# Patient Record
Sex: MALE | Race: WHITE | NOT HISPANIC OR LATINO | Employment: FULL TIME | URBAN - METROPOLITAN AREA
[De-identification: names, ages, dates, MRNs, and addresses within clinical notes are randomized per-mention and may not be internally consistent; named-entity substitution may affect disease eponyms.]

---

## 2018-11-13 ENCOUNTER — HOSPITAL ENCOUNTER (EMERGENCY)
Facility: CLINIC | Age: 28
Discharge: HOME | End: 2018-11-13
Attending: PHYSICIAN ASSISTANT | Admitting: PHYSICIAN ASSISTANT

## 2018-11-13 ENCOUNTER — APPOINTMENT (OUTPATIENT)
Dept: GENERAL RADIOLOGY | Facility: CLINIC | Age: 28
End: 2018-11-13
Attending: PHYSICIAN ASSISTANT

## 2018-11-13 VITALS
HEART RATE: 80 BPM | SYSTOLIC BLOOD PRESSURE: 154 MMHG | RESPIRATION RATE: 18 BRPM | OXYGEN SATURATION: 98 % | DIASTOLIC BLOOD PRESSURE: 88 MMHG | TEMPERATURE: 98.2 F

## 2018-11-13 DIAGNOSIS — S90.31XA CONTUSION OF RIGHT FOOT, INITIAL ENCOUNTER: ICD-10-CM

## 2018-11-13 PROCEDURE — 73630 X-RAY EXAM OF FOOT: CPT | Mod: RT

## 2018-11-13 PROCEDURE — 73650 X-RAY EXAM OF HEEL: CPT | Mod: RT

## 2018-11-13 PROCEDURE — 99284 EMERGENCY DEPT VISIT MOD MDM: CPT

## 2018-11-13 ASSESSMENT — ENCOUNTER SYMPTOMS: ARTHRALGIAS: 1

## 2018-11-13 NOTE — ED TRIAGE NOTES
Patient states he had a fall on Halloween and landed with his full weight onto his right heel. States he did not seek medical care at that time as he does not have health insurance and also because he thought the discomfort would improve on its own. Patient states he continues with symptoms and is even missing work due to the discomfort.

## 2018-11-13 NOTE — ED AVS SNAPSHOT
[facility]    [address]    [facility] [address]    Phone:  [phone]    Fax:  [phone]                                       Mark Teran   MRN: [id number]    Department:  [facility]   Date of Visit:  11/13/2018           After Visit Summary Signature Page     I have received my discharge instructions, and my questions have been answered. I have discussed any challenges I see with this plan with the nurse or doctor.    ..........................................................................................................................................  Patient/Patient Representative Signature      ..........................................................................................................................................  Patient Representative Print Name and Relationship to Patient    ..................................................               ................................................  Date                                   Time    ..........................................................................................................................................  Reviewed by Signature/Title    ...................................................              ..............................................  Date                                               Time          22EPIC Rev 08/18

## 2018-11-13 NOTE — LETTER
November 13, 2018      To Whom It May Concern:      Mark Teran was seen in our Emergency Department today, 11/13/18.  I expect his condition to improve over the next 1-2 days.  He may return to work when improved.    Sincerely,        Matilde Ayala PA-C

## 2018-11-13 NOTE — ED PROVIDER NOTES
History     Chief Complaint:  Foot Pain    HPI   Mark Teran is a 28 year old male who presents to the emergency department for evaluation of foot pain. The patient reports he was at a Halloween party on 11/31 when he sustained an injury to his right heel. He states he does not have full recall of the events of that night but suspects he may have jumped off his stairs onto concrete. The patient indicates he has, since 11/31, experienced right heel pain, worsened with standing or ambulation, although he is able to do so with pain. He has taken ibuprofen and Excedrin with mild improvement in pain management. He also remarks the pain worsens at night.    Allergies:  NKDA     Medications:    Lamictal     Past Medical History:    Depressive disorder    Past Surgical History:    The patient does not have any pertinent past surgical history.    Family History:    No past pertinent family history.    Social History:  Presents with female .  Current some day smoker, 0.5 ppd.  Positive for alcohol use.   Marital Status:  Single [1]     Review of Systems   Musculoskeletal: Positive for arthralgias and gait problem.   All other systems reviewed and are negative.    Physical Exam     Patient Vitals for the past 24 hrs:   BP Temp Pulse Resp SpO2   11/13/18 1733 154/88 98.2  F (36.8  C) 80 18 98 %       Physical Exam  Constitutional: Alert, attentive, GCS 15   CV: 2+ DP and PT pulses, brisk distal cap refill  Pulm: No respiratory distress  MSK: Right foot: Tenderness overlying the calcaneus bone, remainder of foot non-tender.  No tenderness to the midfoot, base of 5th metatarsal or proximal tib/fib.No ecchymosis, swelling, or erythema.  Neurological: Alert, attentive  5/5 strength to the DF and PF motor functions; sensation intact right foot.  Skin: Skin is warm and dry.  Psych: Normal mood and affect     Emergency Department Course     Imaging:  Radiographic findings were communicated with the patient who voiced  understanding of the findings.    XR Foot, G/E 3 Views, Right :  No acute osseous abnormality demonstrated. As per radiology.    XR Calcaneus Right G/E 2 Views :  No acute osseous abnormality demonstrated. As per radiology.    Emergency Department Course:  Nursing notes and vitals reviewed. 1750 I performed an exam of the patient as documented above.     The patient was sent for a XR Foot, XR Calcaneus while in the emergency department, findings above.     1847 I rechecked the patient and discussed the results of his workup thus far.     Findings and plan explained to the Patient. Patient discharged home with instructions regarding supportive care, medications, and reasons to return. The importance of close follow-up was reviewed.     Impression & Plan      Medical Decision Making:  Mark Teran is a 28 year old male presents with right heel pain.  X-ray negative for fracture.  Signs and symptoms are consistent with a contusion.  A broad differential was considered including sprain, fracture, tendon rupture, nerve impingement/compromise, referred pain. Supportive outpatient management is indicated.  Rest, ice, and elevation treatment discussed with the patient. Close plans follow-up with orthopedics should pain continue.     Diagnosis:    ICD-10-CM   1. Contusion of right foot, initial encounter S90.31XA       Disposition:  discharged to home    INino, am serving as a scribe on 11/13/2018 at 5:32 PM to personally document services performed by Matilde Ayala PA-C based on my observations and the provider's statements to me.     Nino Vargas  11/13/2018   [facility] EMERGENCY DEPARTMENT       Matilde Ayala PA-C  11/13/18 2329

## 2018-11-13 NOTE — ED AVS SNAPSHOT
[facility] Emergency Department    [address]    [place] [address]    Phone:  [phone]    Fax:  [phone]                                       Mark Teran   MRN: [id number]    Department:  [facility] Emergency Department   Date of Visit:  11/13/2018           Patient Information     Date Of Birth          1990        Your diagnoses for this visit were:     Contusion of right foot, initial encounter        You were seen by Matilde Ayala PA-C.      Follow-up Information     Follow up with [facility] In 2 days.    Why:  As needed    Contact information:    [address], [facility] [address]  [facility] [address]  [phone]          Follow up with [facility] Emergency Department.    Specialty:  EMERGENCY MEDICINE    Why:  As needed, If symptoms worsen    Contact information:    [address] [facility] [address] [phone]        Discharge Instructions       Take ibuprofen 600 mg 3x per day.  This will provide both pain control and fight against inflammation.   Use ACE wrap and crutches as needed for relief of pain.   Follow up with orthopedics in 2-3 days if symptoms persist.         Foot Contusion  You have a contusion. This is also called a bruise. There is swelling and some bleeding under the skin, but no broken bones. This injury generally takes a few days to a few weeks to heal.  During that time, the bruise will typically change in color from reddish, to purple-blue, to greenish-yellow, then to yellow-brown.  Home care    Elevate the foot to reduce pain and swelling. As much as possible, sit or lie down with the foot raised about the level of your heart. This is especially important during the first 48 hours.    Ice the foot to help reduce pain and swelling. Wrap a cold source (ice pack or ice cubes in a plastic bag) in a thin towel. Apply to the bruised area for 20 minutes every 1 to 2  hours the first day. Continue this 3 to 4 times a day until the pain and swelling goes away.    Unless another medicine was prescribed, you can take acetaminophen, ibuprofen, or naproxen to control pain. (If you have chronic liver or kidney disease or ever had a stomach ulcer or gastrointestinal bleeding, talk with your healthcare provider before using these medicines.)  Follow up  Follow up with your healthcare provider or our staff as advised. Call if you are not improving within 1 to 2 weeks.  When to seek medical advice   Call your healthcare provider right away if you have any of the following:    Increased pain or swelling    Foot or leg becomes cold, blue, numb or tingly    Signs of infection: Warmth, drainage, or increased redness or pain around the bruise    Inability to move the injured foot     Frequent bruising for unknown reasons  Date Last Reviewed: 2/1/2017 2000-2018 [employer]. [address] [place] [address]. All rights reserved. This information is not intended as a substitute for professional medical care. Always follow your healthcare professional's instructions.          24 Hour Appointment Hotline       To make an appointment at any [facility], call [phone] ([phone]). If you don't have a family doctor or clinic, we will help you find one. [facility] clinics are conveniently located to serve the needs of you and your family.             Review of your medicines      Our records show that you are taking the medicines listed below. If these are incorrect, please call your family doctor or clinic.        Dose / Directions Last dose taken    LAMICTAL PO        Refills:  0                Procedures and tests performed during your visit     Foot  XR, G/E 3 views, right    XR Calcaneus Right G/E 2 Views      Orders Needing Specimen Collection     None      Pending Results     No orders found from 11/11/2018 to 11/14/2018.            Pending Culture Results      No orders found from 11/11/2018 to 11/14/2018.            Pending Results Instructions     If you had any lab results that were not finalized at the time of your Discharge, you can call the ED Lab Result RN at [phone]. You will be contacted by this team for any positive Lab results or changes in treatment. The nurses are available 7 days a week from 10A to 6:30P.  You can leave a message 24 hours per day and they will return your call.        Test Results From Your Hospital Stay        11/13/2018  6:31 PM      Narrative     RIGHT FOOT THREE VIEWS  11/13/2018 6:09 PM     HISTORY: Pain over calcaneus bone.    COMPARISON: None.    FINDINGS: There is no significant degenerative change. The Lisfranc  joint appears unremarkable in these views. The plantar arch is  unremarkable in these views. There is no acute fracture or  dislocation. There are no worrisome bony lesions.        Impression     IMPRESSION: No acute osseous abnormality demonstrated.    GIOVANNI RIVERA MD         11/13/2018  6:30 PM      Narrative     CALCANEUS/OS CALSIS/HEEL TWO VIEWS RIGHT 11/13/2018 6:09 PM     HISTORY: Pain over calcaneus bone.    COMPARISON: None.    FINDINGS: There is no significant degenerative change. There is no  acute fracture. No dislocation. There are no worrisome bony lesions.        Impression     IMPRESSION:  No acute osseous abnormality demonstrated.    GIOVANNI RIVERA MD                Clinical Quality Measure: Blood Pressure Screening     Your blood pressure was checked while you were in the emergency department today. The last reading we obtained was  BP: 154/88 . Please read the guidelines below about what these numbers mean and what you should do about them.  If your systolic blood pressure (the top number) is less than 120 and your diastolic blood pressure (the bottom number) is less than 80, then your blood pressure is normal. There is nothing more that you need to do about it.  If your systolic blood pressure (the  "top number) is 120-139 or your diastolic blood pressure (the bottom number) is 80-89, your blood pressure may be higher than it should be. You should have your blood pressure rechecked within a year by a primary care provider.  If your systolic blood pressure (the top number) is 140 or greater or your diastolic blood pressure (the bottom number) is 90 or greater, you may have high blood pressure. High blood pressure is treatable, but if left untreated over time it can put you at risk for heart attack, stroke, or kidney failure. You should have your blood pressure rechecked by a primary care provider within the next 4 weeks.  If your provider in the emergency department today gave you specific instructions to follow-up with your doctor or provider even sooner than that, you should follow that instruction and not wait for up to 4 weeks for your follow-up visit.        Thank you for choosing [facility]       Thank you for choosing [facility] for your care. Our goal is always to provide you with excellent care. Hearing back from our patients is one way we can continue to improve our services. Please take a few minutes to complete the written survey that you may receive in the mail after you visit with us. Thank you!        [employer] Information     [employer] lets you send messages to your doctor, view your test results, renew your prescriptions, schedule appointments and more. To sign up, go to [email or web address] . Click on \"Log in\" on the left side of the screen, which will take you to the Welcome page. Then click on \"Sign up Now\" on the right side of the page.     You will be asked to enter the access code listed below, as well as some personal information. Please follow the directions to create your username and password.     Your access code is: [email or web address]  Expires: 2019  6:54 PM     Your access code will  in 90 days. If you need help or a new code, please call your [facility] clinic or [phone].      "   Care EveryWhere ID     This is your Care EveryWhere ID. This could be used by other organizations to access your [facility] medical records  [id number]        Equal Access to Services     DIANA ULLOA : Marcie Lozada, fernando lin, clyde denton, rick leggett. So [facility] [phone].    ATENCIÓN: Si habla español, tiene a griffiths disposición servicios gratuitos de asistencia lingüística. Llame al [phone].    We comply with applicable federal civil rights laws and [place] laws. We do not discriminate on the basis of race, color, national origin, age, disability, sex, sexual orientation, or gender identity.            After Visit Summary       This is your record. Keep this with you and show to your community pharmacist(s) and doctor(s) at your next visit.

## 2018-11-14 NOTE — DISCHARGE INSTRUCTIONS
Take ibuprofen 600 mg 3x per day.  This will provide both pain control and fight against inflammation.   Use ACE wrap and crutches as needed for relief of pain.   Follow up with orthopedics in 2-3 days if symptoms persist.         Foot Contusion  You have a contusion. This is also called a bruise. There is swelling and some bleeding under the skin, but no broken bones. This injury generally takes a few days to a few weeks to heal.  During that time, the bruise will typically change in color from reddish, to purple-blue, to greenish-yellow, then to yellow-brown.  Home care    Elevate the foot to reduce pain and swelling. As much as possible, sit or lie down with the foot raised about the level of your heart. This is especially important during the first 48 hours.    Ice the foot to help reduce pain and swelling. Wrap a cold source (ice pack or ice cubes in a plastic bag) in a thin towel. Apply to the bruised area for 20 minutes every 1 to 2 hours the first day. Continue this 3 to 4 times a day until the pain and swelling goes away.    Unless another medicine was prescribed, you can take acetaminophen, ibuprofen, or naproxen to control pain. (If you have chronic liver or kidney disease or ever had a stomach ulcer or gastrointestinal bleeding, talk with your healthcare provider before using these medicines.)  Follow up  Follow up with your healthcare provider or our staff as advised. Call if you are not improving within 1 to 2 weeks.  When to seek medical advice   Call your healthcare provider right away if you have any of the following:    Increased pain or swelling    Foot or leg becomes cold, blue, numb or tingly    Signs of infection: Warmth, drainage, or increased redness or pain around the bruise    Inability to move the injured foot     Frequent bruising for unknown reasons  Date Last Reviewed: 2/1/2017 2000-2018 [employer]. [address] [facility] [place] [address]. All rights reserved. This  information is not intended as a substitute for professional medical care. Always follow your healthcare professional's instructions.

## 2021-09-20 ENCOUNTER — OFFICE VISIT (OUTPATIENT)
Dept: INTERNAL MEDICINE | Facility: CLINIC | Age: 31
End: 2021-09-20
Payer: COMMERCIAL

## 2021-09-20 VITALS
WEIGHT: 181.5 LBS | DIASTOLIC BLOOD PRESSURE: 86 MMHG | SYSTOLIC BLOOD PRESSURE: 130 MMHG | BODY MASS INDEX: 24.62 KG/M2 | HEART RATE: 83 BPM | TEMPERATURE: 98.2 F | RESPIRATION RATE: 16 BRPM | OXYGEN SATURATION: 98 %

## 2021-09-20 DIAGNOSIS — F31.0 BIPOLAR AFFECTIVE DISORDER, CURRENT EPISODE HYPOMANIC (H): Primary | ICD-10-CM

## 2021-09-20 PROCEDURE — 99204 OFFICE O/P NEW MOD 45 MIN: CPT | Performed by: INTERNAL MEDICINE

## 2021-09-20 RX ORDER — QUETIAPINE FUMARATE 100 MG/1
TABLET, FILM COATED ORAL
Qty: 30 TABLET | Refills: 0 | Status: SHIPPED | OUTPATIENT
Start: 2021-09-20 | End: 2021-10-15

## 2021-09-20 RX ORDER — DESONIDE 0.5 MG/G
CREAM TOPICAL
COMMUNITY
Start: 2021-08-27

## 2021-09-20 ASSESSMENT — ANXIETY QUESTIONNAIRES
2. NOT BEING ABLE TO STOP OR CONTROL WORRYING: NEARLY EVERY DAY
IF YOU CHECKED OFF ANY PROBLEMS ON THIS QUESTIONNAIRE, HOW DIFFICULT HAVE THESE PROBLEMS MADE IT FOR YOU TO DO YOUR WORK, TAKE CARE OF THINGS AT HOME, OR GET ALONG WITH OTHER PEOPLE: EXTREMELY DIFFICULT
1. FEELING NERVOUS, ANXIOUS, OR ON EDGE: NEARLY EVERY DAY
6. BECOMING EASILY ANNOYED OR IRRITABLE: NEARLY EVERY DAY
3. WORRYING TOO MUCH ABOUT DIFFERENT THINGS: NEARLY EVERY DAY
5. BEING SO RESTLESS THAT IT IS HARD TO SIT STILL: NEARLY EVERY DAY
GAD7 TOTAL SCORE: 21
7. FEELING AFRAID AS IF SOMETHING AWFUL MIGHT HAPPEN: NEARLY EVERY DAY

## 2021-09-20 ASSESSMENT — PATIENT HEALTH QUESTIONNAIRE - PHQ9
5. POOR APPETITE OR OVEREATING: NEARLY EVERY DAY
SUM OF ALL RESPONSES TO PHQ QUESTIONS 1-9: 13

## 2021-09-21 ASSESSMENT — ANXIETY QUESTIONNAIRES: GAD7 TOTAL SCORE: 21

## 2021-10-13 DIAGNOSIS — F31.0 BIPOLAR AFFECTIVE DISORDER, CURRENT EPISODE HYPOMANIC (H): ICD-10-CM

## 2021-10-14 NOTE — TELEPHONE ENCOUNTER
Routing refill request to provider for review/approval because:  Last signed by Dr. Lan     Antipsychotic Medications Hgtlir19/13/2021 02:28 PM   Lipid panel on file within the past 12 months Protocol Details    CBC on file in past 12 months     A1c or Glucose on file in past 12 months     Recent (6 mo) or future (30 days) visit within the authorizing provider's specialty      Alma Pearson RN

## 2021-10-15 RX ORDER — QUETIAPINE FUMARATE 100 MG/1
100 TABLET, FILM COATED ORAL EVERY EVENING
Qty: 60 TABLET | Refills: 0 | Status: SHIPPED | OUTPATIENT
Start: 2021-10-15 | End: 2021-12-14

## 2021-10-15 NOTE — TELEPHONE ENCOUNTER
Mark really does need to establish with psychiatry as I am concerned he has undiagnosed bipolar disorder which is being inadequately treated with Seroquel. I can refill this medication for another short period of time but please call Mark and give him the information for him to establish with psychiatry (referral was placed last month at our only visit) so he can get the best care here.

## 2021-10-19 RX ORDER — QUETIAPINE FUMARATE 100 MG/1
100 TABLET, FILM COATED ORAL EVERY EVENING
Qty: 60 TABLET | Refills: 0 | Status: CANCELLED | OUTPATIENT
Start: 2021-10-19

## 2021-10-19 NOTE — TELEPHONE ENCOUNTER
Dr Lan,     Patient has an appointment Thursday Oct 21st virtual at [facility], but he will run out that day. Are you willing to send in short refill supply- did you already send it in ( it looks like that way).       Patient sound great on the phone.     Paula Simpson RN

## 2021-12-10 DIAGNOSIS — F31.0 BIPOLAR AFFECTIVE DISORDER, CURRENT EPISODE HYPOMANIC (H): ICD-10-CM

## 2021-12-10 DIAGNOSIS — Z79.899 LONG TERM CURRENT USE OF ANTIPSYCHOTIC MEDICATION: Primary | ICD-10-CM

## 2021-12-10 NOTE — CONFIDENTIAL NOTE
Routing refill request to provider for review/approval because:  Labs out of range:  Lipid. CBC, A1C/glucose

## 2021-12-11 NOTE — TELEPHONE ENCOUNTER
Can someone reach out to Mark and schedule a follow-up visit (virtual video okay)? This script was supposed to be a bridge to a psychiatrist taking over his mental health medications. At his last refill request, he told RN that he had an appointment scheduled in October to establish with psychiatry. I see no record of that (though it may have been on outside psychiatrist). Per my count, he should have run out of Seroquel about a month ago. My assumption has been psychiatry has adjusted his regimen, but he subsequently submitted this refill request. We need to clarify what's going on with his meds here. If he is planning on remaining on Seroquel, he is overdue for some labs.

## 2021-12-13 NOTE — TELEPHONE ENCOUNTER
Patient returned call, he did reach out to  for mental health services but could not be seen until February.  He decided to try [facility] but stated the cost was to much, it would 'cost me a $1000 a month.'  Patient is unsure if the medication is even doing anything for him.  Stated 'I cant put my family in debt for my mental health, you have to [place] get mental health.'    Advised of labs needed and he is uncertain if he is going to schedule at this time.  He was working and it was hard to hear with a lot of background noise.  Patient also stated it is hard to get an appointment because we are only open M-F.    Asia Berry RN

## 2021-12-13 NOTE — TELEPHONE ENCOUNTER
If the medication is not working, can we schedule a virtual or in-person follow up visit? I also encourage him to make an appointment with [facility] mental [facility] services to get something on the books even if it's months away.

## 2021-12-14 RX ORDER — QUETIAPINE FUMARATE 100 MG/1
TABLET, FILM COATED ORAL
Qty: 55 TABLET | Refills: 0 | Status: SHIPPED | OUTPATIENT
Start: 2021-12-14 | End: 2022-01-25

## 2021-12-14 NOTE — TELEPHONE ENCOUNTER
Pt returned call and he was notified of the message from Dr. Lan. Advised pt to contact his insurance in regards to cheapest places to go for psychiatry. Pt reports he will do this.     Gladis Hyde RN

## 2021-12-14 NOTE — TELEPHONE ENCOUNTER
I'm empathetic to his desire to avoid bills, time off work, etc. but bipolar disorder really is a condition that requires close follow-up, especially during the stabilization period. I strongly recommend Mark schedule with psychiatry. Happy to see him in the interim if he desires. I have ordered the requisite labs to monitor long-term Seroquel use. Okay for him to titrate up from 100mg daily to 200mg daily to see if he gets any additional benefit from increased dose. Any further questions or concerns should be addressed via formal appointment.    Lior Paredes MD, MPH  [facility]  Internal Medicine

## 2021-12-14 NOTE — TELEPHONE ENCOUNTER
Routing refill request to provider for review/approval because:  Please see phone message below     Shahbaz Dejesus RN  [facility] Triage Nurse    Color consistent with ethnicity/race, warm, dry intact, resilient.

## 2021-12-14 NOTE — TELEPHONE ENCOUNTER
Called patient. He states he is feeling manic low and like the Seroquel did nothing for his anxiety but he might be sleeping slightly better. Offered virtual visit and to schedule with mental health. Patient states he cant he needs to call billing as he cant afford all of these bills. He also states he can only see Dr. dowd after 9pm M-Sat or on Sunday as he works. He states he does not get a lunch break and can not ask for  a 30 min break to fit in a virtual visit. He is hoping Dr. Dowd can prescribe something or adjust dose. Explained this is difficult to do without a visit but nurse would send message    Please advise    Shahbaz Dejesus RN

## 2022-01-24 ENCOUNTER — TELEPHONE (OUTPATIENT)
Dept: INTERNAL MEDICINE | Facility: CLINIC | Age: 32
End: 2022-01-24
Payer: COMMERCIAL

## 2022-01-24 DIAGNOSIS — F31.0 BIPOLAR AFFECTIVE DISORDER, CURRENT EPISODE HYPOMANIC (H): ICD-10-CM

## 2022-01-24 NOTE — TELEPHONE ENCOUNTER
"Pt called reporting frustration that he is unable to get an appt mental health provider. States he made an appt with a mental health clinic and insurance only paid for $5 and now owes $500. States he doesn't have money to pay $2,500 for an appt. Reports he was called to cancel mental health appt tonight and plans to stop medication \"I'm givin up\". Triage asked if any suicidal thoughts or plans. Pt denies. States he can get a counselor through \"[facility]\" but he doesn't need a counselor. States he needs medication \"I know what I need\".     Triage discouraged him from discontinuing medication without medical advice. Advised he seek care at [facility] in path unit if any suicidal thoughts or plans. Asked if he would like a referral for social work or care coordinator to provide mental health resources but pt laughed and      Routing message to PCP.  Pt needs Rx renewal for Seroquel please advice if Rx renewal is appropriate. Any other recommendations from provider?   "

## 2022-01-25 RX ORDER — QUETIAPINE FUMARATE 200 MG/1
200 TABLET, FILM COATED ORAL EVERY EVENING
Qty: 90 TABLET | Refills: 0 | Status: SHIPPED | OUTPATIENT
Start: 2022-01-25 | End: 2022-02-28

## 2022-01-25 NOTE — TELEPHONE ENCOUNTER
Patient was called with provider's message. He agreed to continue taking Seroquel. He is asking if he needs any blood work to check how Seroquel is affecting him. Any other lab work to be done in addition to lipid profile and Hgb A1C? Will need to call back to schedule a lab only appt.     Pt also verbalized his incompetence to schedule the correct appt with psychiatry and know which ones would be covered. He did contact his insurance, but is unable to afford the options for psychiatry they offered. He is open to a call from clinic's  in regards to this. Care coordination referral placed.

## 2022-01-25 NOTE — TELEPHONE ENCOUNTER
I'm sorry to hear Mark is doing so poorly and is unable to get the care he clearly needs. As I discussed with him the one time we met, I'm not comfortable managing his bipolar disorder but am happy to help as best as I can. I do think he would GREATLY benefit from formal psychiatric care from a mental health expert. I am empathetic to his financial situation. I have refilled the Seroquel at his current dose for another 3 months so that he's not without any medication, but this is an imperfect solution to a serious issue and I do still strongly recommend he seek formal psychiatric care.    Lior Paredes MD, MPH  [facility]  Internal Medicine

## 2022-01-26 ENCOUNTER — PATIENT OUTREACH (OUTPATIENT)
Dept: NURSING | Facility: CLINIC | Age: 32
End: 2022-01-26
Payer: COMMERCIAL

## 2022-01-26 NOTE — PROGRESS NOTES
Clinic Care Coordination Contact  [facility]    Referral Source: PCP  Clinical Data: Care Coordinator Outreach  Outreach attempted x 1.  Spoke to patient briefly who would like a call back in the morning as he was driving at the time of outreach.  Plan:  Care Coordinator will try to reach patient again in 1-2 business days.      SARINA Lucero  Clinic Care Coordinator  [facility]  [phone]  [email or web address], [email or web address]

## 2022-02-05 ENCOUNTER — LAB (OUTPATIENT)
Dept: LAB | Facility: CLINIC | Age: 32
End: 2022-02-05
Payer: COMMERCIAL

## 2022-02-05 DIAGNOSIS — Z79.899 LONG TERM CURRENT USE OF ANTIPSYCHOTIC MEDICATION: ICD-10-CM

## 2022-02-05 LAB
CHOLEST SERPL-MCNC: 209 MG/DL
FASTING STATUS PATIENT QL REPORTED: YES
HBA1C MFR BLD: 5.2 % (ref 0–5.6)
HDLC SERPL-MCNC: 45 MG/DL
LDLC SERPL CALC-MCNC: 150 MG/DL
NONHDLC SERPL-MCNC: 164 MG/DL
TRIGL SERPL-MCNC: 69 MG/DL

## 2022-02-05 PROCEDURE — 80061 LIPID PANEL: CPT

## 2022-02-05 PROCEDURE — 83036 HEMOGLOBIN GLYCOSYLATED A1C: CPT

## 2022-02-05 PROCEDURE — 36415 COLL VENOUS BLD VENIPUNCTURE: CPT

## 2022-02-09 ENCOUNTER — OFFICE VISIT (OUTPATIENT)
Dept: URGENT CARE | Facility: URGENT CARE | Age: 32
End: 2022-02-09
Payer: COMMERCIAL

## 2022-02-09 VITALS
TEMPERATURE: 98.4 F | WEIGHT: 180 LBS | SYSTOLIC BLOOD PRESSURE: 142 MMHG | BODY MASS INDEX: 24.41 KG/M2 | RESPIRATION RATE: 20 BRPM | OXYGEN SATURATION: 96 % | HEART RATE: 95 BPM | DIASTOLIC BLOOD PRESSURE: 93 MMHG

## 2022-02-09 DIAGNOSIS — M62.830 BACK MUSCLE SPASM: Primary | ICD-10-CM

## 2022-02-09 PROCEDURE — 99213 OFFICE O/P EST LOW 20 MIN: CPT | Performed by: PHYSICIAN ASSISTANT

## 2022-02-09 RX ORDER — CYCLOBENZAPRINE HCL 10 MG
10 TABLET ORAL 3 TIMES DAILY PRN
Qty: 21 TABLET | Refills: 0 | Status: SHIPPED | OUTPATIENT
Start: 2022-02-09 | End: 2022-02-16

## 2022-02-09 RX ORDER — PREDNISONE 20 MG/1
40 TABLET ORAL DAILY
Qty: 10 TABLET | Refills: 0 | Status: SHIPPED | OUTPATIENT
Start: 2022-02-09 | End: 2022-02-14

## 2022-02-09 NOTE — LETTER
[facility] [place]  [address] [address]  Phone: [phone]    February 9, 2022        Mark Teran  [address] [facility] [address]          To whom it may concern:    RE: Mark Teran    Patient was seen and treated today at our clinic and missed work.  Please excuse absences on 2/9 and 2/10/22.  Additionally, please limit lifting and pulling to <10# until symptoms resolve or follow up with sports medicine in 1 week.    Please contact me for questions or concerns.      Sincerely,        Kwadwo Carrero PA-C

## 2022-02-09 NOTE — PATIENT INSTRUCTIONS
Patient Education     Relieving Back Pain  Back pain is a common problem. You can strain back muscles by lifting too much weight or just by moving the wrong way. Back strain can be uncomfortable, even painful. And it can take weeks or months to improve. To help yourself feel better and prevent future back strains, try these tips.  Important: Don't give aspirin to children or teens without first discussing it with your child's healthcare provider.  Ice    Ice reduces muscle pain and swelling. It helps most during the first 24 to 48 hours after an injury.    Wrap an ice pack or a bag of frozen peas in a thin towel. Never put ice directly on your skin.    Place the ice where your back hurts the most.    Don t ice for more than 20 minutes at a time.    You can use ice several times a day.  Medicines  Over-the-counter pain relievers include acetaminophen and anti-inflammatory medicines, which includes aspirin, naproxen, or ibuprofen. They can help ease discomfort. Some also reduce swelling.    Tell your healthcare provider about any medicines you are already taking.    Take medicines only as directed.  Manipulation and massage  Having manipulation by an osteopathic doctor or chiropractor may be helpful. Getting a massage also may help.   Heat  After the first 48 hours, heat can relax sore muscles and improve blood flow.    Try a warm bath or shower. Or use a heating pad set on low. To prevent a burn, keep a cloth between you and the heating pad.    Don t use a heating pad for more than 15 minutes at a time. Never sleep on a heating pad.  [employer] last reviewed this educational content on 6/1/2018 2000-2021 [employer]. All rights reserved. This information is not intended as a substitute for professional medical care. Always follow your healthcare professional's instructions.           Patient Education     Back Spasm (No Trauma)    Spasm of the back muscles can occur after a sudden forceful twisting  or bending such as in a car accident. A spasm can also happen after a simple awkward movement, or after lifting something heavy with poor body positioning. In any case, muscle spasm adds to the pain. Sleeping in an awkward position or on a poor quality mattress can also cause this. Some people respond to emotional stress by tensing the muscles of their back.  Pain that continues may need further assessment or other types of treatment such as physical therapy.  You don't always need X-rays for the first assessment of back pain, unless you had a physical injury such as from a car accident or fall. If your pain continues and doesn't respond to medical treatment, X-rays and other tests may then be done.   Home care    As soon as possible, start sitting or walking again. This will help prevent problems from a long bed rest. These problems include muscle weakness, worsening back stiffness and pain, and blood clots in the legs.    When in bed, try to find a position of comfort. A firm mattress is best. Try lying flat on your back with pillows under your knees. You can also try lying on your side with your knees bent up toward your chest and a pillow between your knees.    Don't sit for long periods. Also limit car rides and travel. This puts more stress on the lower back than standing or walking.     During the first 24 to 72 hours after an injury or flare-up, put an ice pack on the painful area for 20 minutes, then remove it for 20 minutes. Do this over a period of 60 to 90 minutes, or several times a day. This will reduce swelling and pain. Always wrap ice packs in a thin towel.    You can start with ice, then switch to heat. Heat from a hot shower, hot bath, or heating pad reduces pain and works well for muscle spasms. Put heat on the painful area for 20 minutes, then remove it for 20 minutes. Do this over a period of 60 to 90 minutes, or several times a day. Don't sleep on a heating pad. It can burn or damage  skin.    Alternate using ice and heat.    Be aware of safe lifting methods. don't lift anything over 15 pounds until all the pain is gone.  Gentle stretching will help your back heal faster. Do this simple routine 2 to 3 times a day until your back is feeling better.    Lie on your back with your knees bent and both feet on the ground.    Slowly raise your left knee to your chest as you flatten your lower back against the floor. Hold for 20 to 30 seconds.    Relax and repeat the exercise with your right knee.    Do 2 to 3 of these exercises for each leg.    Repeat, hugging both knees to your chest at the same time.    Don't bounce, but use a gentle pull.  Medicines  Talk with your doctor before using medicine, especially if you have other medical problems or are taking other medicines.  You may use over-the-counter medicines such as acetaminophen, ibuprofen, or naprosyn to control pain, unless your healthcare provider prescribed another pain medicine. Talk with your healthcare provider if you have a chronic condition such as diabetes, liver or kidney disease, stomach ulcer, or digestive bleeding, or are taking blood thinners.  Be careful if you are given prescription pain medicine, opioids, or medicine for muscle spasm. They can cause drowsiness, and affect your coordination, reflexes, and judgment. Don't drive or operate heavy machinery when taking these medicines. Take pain medicine only as prescribed by your healthcare provider.  Follow-up care  Follow up with your doctor, or as advised. You may need physical therapy or more tests.  If X-rays were taken, they may be reviewed by a radiologist. You will be told of any new findings that may affect your care.  Call   Call if any of these occur:    Trouble breathing    Confusion    Drowsiness or trouble awakening    Fainting or loss of consciousness    Rapid or very slow heart rate    Loss of bowel or bladder control  When to seek medical advice  Call your healthcare  provider right away if any of these occur:    Pain becomes worse or spreads to your legs    Weakness or numbness in one or both legs    Numbness in the groin or genital area    Fever of 100.4 F (38 C) or higher , or as directed by your healthcare provider    Chills    Burning or pain when passing urine  [employer] last reviewed this educational content on 11/1/2018 2000-2021 [employer]. All rights reserved. This information is not intended as a substitute for professional medical care. Always follow your healthcare professional's instructions.

## 2022-02-09 NOTE — PROGRESS NOTES
SUBJECTIVE:  Chief Complaint   Patient presents with     Back Pain     mid-low areas for about 3 days     Mark Teran is a 31 year old male presents with a chief complaint of bilateral lowback pain.  The injury occurred 3 day(s) ago.   The injury happened while at home. How: sitting on bed for 8 hours straight playing video games.  The patient complained of moderate pain  and has not had decreased ROM.  Pain exacerbated by weight-bearing, movement, twisting and flexion/extension.  Relieved by nothing.  He treated it initially with ice. This is the first time this type of injury has occurred to this patient.     No numbness, tingling, weakness or loss of function    No past medical history on file.  Current Outpatient Medications   Medication Sig Dispense Refill     cyclobenzaprine (FLEXERIL) 10 MG tablet Take 1 tablet (10 mg) by mouth 3 times daily as needed for muscle spasms 21 tablet 0     desonide (DESOWEN) 0.05 % external cream APPLY TWICE DAILY TO LSC, 3 WEEKS ON, 1 WEEK OF. REPEAT AS NEEDED       predniSONE (DELTASONE) 20 MG tablet Take 2 tablets (40 mg) by mouth daily for 5 days 10 tablet 0     QUEtiapine (SEROQUEL) 200 MG tablet Take 1 tablet (200 mg) by mouth every evening 90 tablet 0     Social History     Tobacco Use     Smoking status: Former Smoker     Packs/day: 0.50     Smokeless tobacco: Never Used   Substance Use Topics     Alcohol use: Yes       ROS:  Review of systems negative except as stated above.    EXAM:   BP (!) 142/93   Pulse 95   Temp 98.4  F (36.9  C)   Resp 20   Wt 81.6 kg (180 lb)   SpO2 96%   BMI 24.41 kg/m    Gen: healthy,alert,no distress  BACK: no midline tenderness, ROM intact  GENERAL APPEARANCE: healthy, alert and no distress  CHEST: clear to auscultation  CV: regular rate and rhythm  SKIN: no suspicious lesions or rashes  NEURO: Normal strength and tone, sensory exam grossly normal, mentation intact and speech normal    X ray deferred at this time    ASSESSMENT:    (M62.238) Back muscle spasm  (primary encounter diagnosis)  Comment: no red flags  Plan: cyclobenzaprine (FLEXERIL) 10 MG tablet,         predniSONE (DELTASONE) 20 MG tablet, Orthopedic         Referral      Red flags and emergent follow up discussed, and understood by patient  Follow up with PCP if symptoms worsen or fail to improve      Patient Instructions     Patient Education     Relieving Back Pain  Back pain is a common problem. You can strain back muscles by lifting too much weight or just by moving the wrong way. Back strain can be uncomfortable, even painful. And it can take weeks or months to improve. To help yourself feel better and prevent future back strains, try these tips.  Important: Don't give aspirin to children or teens without first discussing it with your child's healthcare provider.  Ice    Ice reduces muscle pain and swelling. It helps most during the first 24 to 48 hours after an injury.    Wrap an ice pack or a bag of frozen peas in a thin towel. Never put ice directly on your skin.    Place the ice where your back hurts the most.    Don t ice for more than 20 minutes at a time.    You can use ice several times a day.  Medicines  Over-the-counter pain relievers include acetaminophen and anti-inflammatory medicines, which includes aspirin, naproxen, or ibuprofen. They can help ease discomfort. Some also reduce swelling.    Tell your healthcare provider about any medicines you are already taking.    Take medicines only as directed.  Manipulation and massage  Having manipulation by an osteopathic doctor or chiropractor may be helpful. Getting a massage also may help.   Heat  After the first 48 hours, heat can relax sore muscles and improve blood flow.    Try a warm bath or shower. Or use a heating pad set on low. To prevent a burn, keep a cloth between you and the heating pad.    Don t use a heating pad for more than 15 minutes at a time. Never sleep on a heating pad.  [employer] last  reviewed this educational content on 6/1/2018 2000-2021 [employer]. All rights reserved. This information is not intended as a substitute for professional medical care. Always follow your healthcare professional's instructions.           Patient Education     Back Spasm (No Trauma)    Spasm of the back muscles can occur after a sudden forceful twisting or bending such as in a car accident. A spasm can also happen after a simple awkward movement, or after lifting something heavy with poor body positioning. In any case, muscle spasm adds to the pain. Sleeping in an awkward position or on a poor quality mattress can also cause this. Some people respond to emotional stress by tensing the muscles of their back.  Pain that continues may need further assessment or other types of treatment such as physical therapy.  You don't always need X-rays for the first assessment of back pain, unless you had a physical injury such as from a car accident or fall. If your pain continues and doesn't respond to medical treatment, X-rays and other tests may then be done.   Home care    As soon as possible, start sitting or walking again. This will help prevent problems from a long bed rest. These problems include muscle weakness, worsening back stiffness and pain, and blood clots in the legs.    When in bed, try to find a position of comfort. A firm mattress is best. Try lying flat on your back with pillows under your knees. You can also try lying on your side with your knees bent up toward your chest and a pillow between your knees.    Don't sit for long periods. Also limit car rides and travel. This puts more stress on the lower back than standing or walking.     During the first 24 to 72 hours after an injury or flare-up, put an ice pack on the painful area for 20 minutes, then remove it for 20 minutes. Do this over a period of 60 to 90 minutes, or several times a day. This will reduce swelling and pain. Always wrap  ice packs in a thin towel.    You can start with ice, then switch to heat. Heat from a hot shower, hot bath, or heating pad reduces pain and works well for muscle spasms. Put heat on the painful area for 20 minutes, then remove it for 20 minutes. Do this over a period of 60 to 90 minutes, or several times a day. Don't sleep on a heating pad. It can burn or damage skin.    Alternate using ice and heat.    Be aware of safe lifting methods. don't lift anything over 15 pounds until all the pain is gone.  Gentle stretching will help your back heal faster. Do this simple routine 2 to 3 times a day until your back is feeling better.    Lie on your back with your knees bent and both feet on the ground.    Slowly raise your left knee to your chest as you flatten your lower back against the floor. Hold for 20 to 30 seconds.    Relax and repeat the exercise with your right knee.    Do 2 to 3 of these exercises for each leg.    Repeat, hugging both knees to your chest at the same time.    Don't bounce, but use a gentle pull.  Medicines  Talk with your doctor before using medicine, especially if you have other medical problems or are taking other medicines.  You may use over-the-counter medicines such as acetaminophen, ibuprofen, or naprosyn to control pain, unless your healthcare provider prescribed another pain medicine. Talk with your healthcare provider if you have a chronic condition such as diabetes, liver or kidney disease, stomach ulcer, or digestive bleeding, or are taking blood thinners.  Be careful if you are given prescription pain medicine, opioids, or medicine for muscle spasm. They can cause drowsiness, and affect your coordination, reflexes, and judgment. Don't drive or operate heavy machinery when taking these medicines. Take pain medicine only as prescribed by your healthcare provider.  Follow-up care  Follow up with your doctor, or as advised. You may need physical therapy or more tests.  If X-rays were taken,  they may be reviewed by a radiologist. You will be told of any new findings that may affect your care.  Call   Call if any of these occur:    Trouble breathing    Confusion    Drowsiness or trouble awakening    Fainting or loss of consciousness    Rapid or very slow heart rate    Loss of bowel or bladder control  When to seek medical advice  Call your healthcare provider right away if any of these occur:    Pain becomes worse or spreads to your legs    Weakness or numbness in one or both legs    Numbness in the groin or genital area    Fever of 100.4 F (38 C) or higher , or as directed by your healthcare provider    Chills    Burning or pain when passing urine  [employer] last reviewed this educational content on 11/1/2018 2000-2021 [employer]. All rights reserved. This information is not intended as a substitute for professional medical care. Always follow your healthcare professional's instructions.

## 2022-02-15 ENCOUNTER — OFFICE VISIT (OUTPATIENT)
Dept: NEUROSURGERY | Facility: CLINIC | Age: 32
End: 2022-02-15
Attending: PHYSICIAN ASSISTANT
Payer: COMMERCIAL

## 2022-02-15 VITALS — DIASTOLIC BLOOD PRESSURE: 88 MMHG | SYSTOLIC BLOOD PRESSURE: 142 MMHG | HEART RATE: 104 BPM | OXYGEN SATURATION: 95 %

## 2022-02-15 DIAGNOSIS — M62.830 BACK MUSCLE SPASM: ICD-10-CM

## 2022-02-15 PROCEDURE — 99203 OFFICE O/P NEW LOW 30 MIN: CPT | Performed by: PHYSICIAN ASSISTANT

## 2022-02-15 NOTE — LETTER
2/15/2022         RE: Mark Teran  [address]  [facility] [address]        Dear Colleague,    Thank you for referring your patient, Mark Teran, to the [facility]. Please see a copy of my visit note below.    Neurosurgery Consult    HPI    Mr. Teran is a 31-year-old male presents to clinic for about a week of back spasms.  He gets occasional pain into his left anterior thigh.  But primarily his back pain in the left low back a little bit in his left mid to upper back.  It began after he was lying in bed for 8 hours straight playing video games.  It has gotten a little bit better but is still significantly bothersome.  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  He denies any bowel or bladder symptoms or saddle anesthesia, denies numbness or weakness in his lower extremities.    Medical history  Bipolar disorder  Tobacco use  Marijuana use    Social history  Works for a food delivery service lifting heavy boxes      B/P: 142/88, T: Data Unavailable, P: 104, R: Data Unavailable       Exam    Alert and oriented no acute distress  Bilateral lower extremities with 5/5 strength  Reflexes 2+ patella/ankle  Negative straight leg raise bilaterally  Negative ankle clonus negative Babinski bilaterally  Lumbar spine nontender to palpation  Able to stand on heels and toes  Gait is normal    Imaging    No imaging to review    Assessment    Acute back spasms and back pain    Plan:      I recommend the patient begin physical therapy.  I gave him a work note to take a week off of work and try and improve his symptoms with PT.  If his symptoms do not improve he will follow-up with us for further evaluation.  If he is feeling better he may return to work at any time.    Total time of 30 minutes spent with the patient today in counseling and coordination of care.      Again, thank you for allowing me to participate in the care  of your patient.        Sincerely,        Kira Verdugo PA-C

## 2022-02-15 NOTE — PROGRESS NOTES
Neurosurgery Consult    HPI    Mr. Teran is a 31-year-old male presents to clinic for about a week of back spasms.  He gets occasional pain into his left anterior thigh.  But primarily his back pain in the left low back a little bit in his left mid to upper back.  It began after he was lying in bed for 8 hours straight playing video games.  It has gotten a little bit better but is still significantly bothersome.  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  He denies any bowel or bladder symptoms or saddle anesthesia, denies numbness or weakness in his lower extremities.    Medical history  Bipolar disorder  Tobacco use  Marijuana use    Social history  Works for a food delivery service lifting heavy boxes      B/P: 142/88, T: Data Unavailable, P: 104, R: Data Unavailable       Exam    Alert and oriented no acute distress  Bilateral lower extremities with 5/5 strength  Reflexes 2+ patella/ankle  Negative straight leg raise bilaterally  Negative ankle clonus negative Babinski bilaterally  Lumbar spine nontender to palpation  Able to stand on heels and toes  Gait is normal    Imaging    No imaging to review    Assessment    Acute back spasms and back pain    Plan:      I recommend the patient begin physical therapy.  I gave him a work note to take a week off of work and try and improve his symptoms with PT.  If his symptoms do not improve he will follow-up with us for further evaluation.  If he is feeling better he may return to work at any time.    Total time of 30 minutes spent with the patient today in counseling and coordination of care.

## 2022-02-15 NOTE — NURSING NOTE
Mark Teran is a 31 year old male who presents for:  Chief Complaint   Patient presents with     Consult     Back spasms and pain        Initial Vitals:  BP (!) 142/88   Pulse 104   SpO2 95%  Estimated body mass index is 24.41 kg/m  as calculated from the following:    Height as of 8/19/13: 6' (1.829 m).    Weight as of 2/9/22: 180 lb (81.6 kg).. There is no height or weight on file to calculate BSA. BP completed using cuff size: large  Data Unavailable    Liam Yan MA

## 2022-02-28 ENCOUNTER — TELEPHONE (OUTPATIENT)
Dept: INTERNAL MEDICINE | Facility: CLINIC | Age: 32
End: 2022-02-28

## 2022-02-28 ENCOUNTER — OFFICE VISIT (OUTPATIENT)
Dept: INTERNAL MEDICINE | Facility: CLINIC | Age: 32
End: 2022-02-28
Payer: COMMERCIAL

## 2022-02-28 VITALS
TEMPERATURE: 98.6 F | OXYGEN SATURATION: 97 % | HEART RATE: 114 BPM | SYSTOLIC BLOOD PRESSURE: 136 MMHG | BODY MASS INDEX: 27.94 KG/M2 | WEIGHT: 206 LBS | DIASTOLIC BLOOD PRESSURE: 80 MMHG

## 2022-02-28 DIAGNOSIS — F31.0 BIPOLAR AFFECTIVE DISORDER, CURRENT EPISODE HYPOMANIC (H): ICD-10-CM

## 2022-02-28 DIAGNOSIS — M62.830 SPASM OF BACK MUSCLES: Primary | ICD-10-CM

## 2022-02-28 PROCEDURE — 99213 OFFICE O/P EST LOW 20 MIN: CPT | Performed by: INTERNAL MEDICINE

## 2022-02-28 RX ORDER — QUETIAPINE FUMARATE 200 MG/1
200 TABLET, FILM COATED ORAL EVERY EVENING
Qty: 90 TABLET | Refills: 2 | Status: SHIPPED | OUTPATIENT
Start: 2022-02-28 | End: 2023-02-03

## 2022-02-28 NOTE — PROGRESS NOTES
Assessment & Plan     Spasm of back muscles  FMLA form filled out and faxed and given back to Mark today. He is cleared to work and I provided him a note to this effect.    Bipolar affective disorder, current episode hypomanic (H)  He has been unable to find affordable psychiatric care but thinks he may have a lead on something. In the meantime, he'd like to continue his current Seroquel dose which I did refill.  - QUEtiapine (SEROQUEL) 200 MG tablet; Take 1 tablet (200 mg) by mouth every evening    Return in about 6 months (around 8/28/2022) for Physical Exam.    Lior Paredes MD  [facility]    Zeynep Flores is a 31 year old who presents for follow-up from a recent UC and neurosurgery visit for low back pain. He tells me he played video games for ~8 hours and was barely able to stand after that. Seen in UC and by neurosurgery who both felt back spasms were Mark's diagnosis. He would like to go back to work. His back is better. His employer is asking him to obtain a clearance for work letter and to fill out FMLA for the time he had off. He tells me the current dose of Seroquel is helping, though his anxiety remains an issue. He has not been able to find a psychiatrist who he can afford. He continues to work on this.    Review of Systems   Constitutional, MSK, neuro, psych systems are negative, except as otherwise noted.      Objective    /80   Pulse 114   Temp 98.6  F (37  C) (Tympanic)   Wt 93.4 kg (206 lb)   SpO2 97%   BMI 27.94 kg/m    Body mass index is 27.94 kg/m .     Physical Exam   GENERAL: alert and in no distress.  PSYCH: speech mildly pressured though improved from past visit

## 2022-02-28 NOTE — LETTER
February 28, 2022      Mark Teran  [address] [facility] [address]        To Whom It May Concern:    Mark Teran was seen in our clinic. He may return to work without restrictions.        Sincerely,        Lior Paredes MD, MPH  [facility]  Internal Medicine

## 2022-02-28 NOTE — TELEPHONE ENCOUNTER
Patient called, stated he had been seen for back pain and was cleared to return to work with no restrictions. Since then, patient has been feeling pulling sensations and a bit of pain. Patient stated his work is requesting restrictions be made by patient's physician.     Patient requests this note for tomorrow be sent to his email [email or web address].

## 2022-03-01 ENCOUNTER — TELEPHONE (OUTPATIENT)
Dept: NEUROSURGERY | Facility: CLINIC | Age: 32
End: 2022-03-01
Payer: COMMERCIAL

## 2022-03-01 NOTE — TELEPHONE ENCOUNTER
Called patient and asked clarifying questions regarding patient's back symptoms. Patient returned to work yesterday and upon returning to work patient told his employer that he was still experiencing some tightness and pressure on the left side of his back/sciatic nerve. Patient states the pain is a 1 out of 10 on the pain scale. 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 Employer checked in with HR and eventually the patient was sent home yesterday and asked to call his physician. Work is requesting restrictions be made by the physician. How many hours can the patient work safely? Can the patient perform range of motion safely?     Shawna Hardin RN

## 2022-03-01 NOTE — TELEPHONE ENCOUNTER
Spoke to patient with providers response. Patient will contact Neurosurgical team for note and work guidelines.

## 2022-03-01 NOTE — TELEPHONE ENCOUNTER
"Last Visit:  2/15    Next Visit:  TBD    Name of Provider:  Kira Verdugo PA-C    Assessment: The patient was seen in the clinic 2/15.  A letter to be off of work for 1 week was provided.   The patient states in the past 2 weeks the left sided lower back pain has resolved. He has returned to work and noticed \"when he stretches to lower boxes he feels a little pull or strain.\"    He could not afford PT.   His employer is asking for a letter to include restrictions for returning to work.   Informed the patient neurosurgery typically only provides restrictions for  post surgical patients. .He has also reached out to his PCP.       Action needed from provider:   Please advise if neurosurgery is able to provide a letter.         "

## 2022-03-01 NOTE — TELEPHONE ENCOUNTER
From my evaluation, Mark is cleared to work without restrictions. He saw a neurosurgery provider last month who asked him to see them again if his symptoms persisted. If Mark has further questions about that, I would defer them to his neurosurgical team.

## 2022-03-01 NOTE — TELEPHONE ENCOUNTER
Patient states was cleared to work without restrictions.  Noticed pain coming back in same area after return to work.  Patients states his employer won't let him work as there is concern that injury is flaring up. Patient needs work release with restrictions.

## 2022-03-02 ENCOUNTER — PATIENT OUTREACH (OUTPATIENT)
Dept: NURSING | Facility: CLINIC | Age: 32
End: 2022-03-02
Payer: COMMERCIAL

## 2022-03-02 NOTE — PROGRESS NOTES
Clinic Care Coordination Contact    CC SW Follow Up Progress Note: CC SW called patient to check in since last outreach.     Assessment: Patient reports that he is doing okay, he is recovering from a back injury.   Feels a little more in control, feels like previously before medication he was disassociated from his body. He doesn't feel this now.     We discussed if he was ready to find a psychiatrist. He is, he just can't afford the visits right now. His insurance has a high deductible.     Patient has enough medication for his bipolar for the next 3 months. CC SW and patient will work to find a psychiatrist that fits his affordability and who is taking new patients.     Care Gaps:    Health Maintenance Due   Topic Date Due     PREVENTIVE CARE VISIT  Never done     ADVANCE CARE PLANNING  Never done     HIV SCREENING  Never done     HEPATITIS C SCREENING  Never done     INFLUENZA VACCINE (1) Never done     COVID-19 Vaccine (3 - Booster for Pfizer series) 10/13/2021       Did not discuss     Goals addressed this encounter:   Goals Addressed                    This Visit's Progress       Patient Stated       1.Mental Health Management (pt-stated)   10%      Goal Statement: I will have med management through psychiatry within the next 6 months.   Date Goal set: January 27, 2022   Barriers: Navigation of the mental health system, financial barriers.   Strengths: Strong relationship with PCP  Date to Achieve By: July 27, 2022  Patient expressed understanding of goal: Yes  Action steps to achieve this goal:  1. I will work with the CC SW to find a psychiatric care.    2. I will establish with psychiatric care  3. I will call the CC SW with questions or concerns.                Outreach Frequency: monthly    Plan:   CC SW and patient will find a psychiatrist that patient can afford.   Care Coordinator will follow up in 2 week.       SARINA Lucero  Clinic Care Coordinator  [facility]  [place]  [phone]  [email or web address]

## 2022-03-04 ENCOUNTER — VIRTUAL VISIT (OUTPATIENT)
Dept: NEUROSURGERY | Facility: CLINIC | Age: 32
End: 2022-03-04
Payer: COMMERCIAL

## 2022-03-04 DIAGNOSIS — M54.50 ACUTE MIDLINE LOW BACK PAIN WITHOUT SCIATICA: Primary | ICD-10-CM

## 2022-03-04 PROCEDURE — 99212 OFFICE O/P EST SF 10 MIN: CPT | Mod: TEL | Performed by: PHYSICIAN ASSISTANT

## 2022-03-04 NOTE — NURSING NOTE
Mark Teran is a 31 year old male who presents for:  Chief Complaint   Patient presents with     RECHECK      Initial Vitals:  Virtual Visit     Sindi Mccall MA

## 2022-03-04 NOTE — PROGRESS NOTES
"Mark Teran is a 31 year old male who is being evaluated via a billable telephone visit.      Due to COVID-19 this visit has been performed over the phone verses face to face.     The patient has been notified of following:     \"This telephone visit will be conducted via a call between you and your physician/provider. We have found that certain health care needs can be provided without the need for a physical exam.  This service lets us provide the care you need with a short phone conversation.  If a prescription is necessary we can send it directly to your pharmacy.  If lab work is needed we can place an order for that and you can then stop by our lab to have the test done at a later time.    If during the course of the call the physician/provider feels a telephone visit is not appropriate, you will not be charged for this service.\"     Mark Teran complains of    Chief Complaint   Patient presents with     RECHECK       I have reviewed and updated the patient's Past Medical History, Social History, Family History and Medication List.    ALLERGIES  Patient has no known allergies.    Additional provider notes:    Mark Teran is a 31 year old male who was seen in clinic about 2 weeks ago, he reports his back pain is significantly improved he does have some slight tightness.  He has returned to work and his work is requiring a specific workability letter.  He denies any radicular symptoms.  He feels nearly back to his baseline which is some stiffness in his low back.  He states he is able to do all of his work duties without issues.    Assessment    Mostly resolved back spasm    Plan:    Patient may return to work, work note was given.  Follow-up as needed.    Phone call duration: 5 minutes  Start Time 145  End Time 150    Kira Verdugo PA-C    Patient provided verbal consent for the phone visit today.   "

## 2022-03-04 NOTE — LETTER
"    3/4/2022         RE: Mark Teran  [address]  [place] [address]        Dear Colleague,    Thank you for referring your patient, Mark Teran, to the [facility]. Please see a copy of my visit note below.    Mark Teran is a 31 year old male who is being evaluated via a billable telephone visit.      Due to COVID-19 this visit has been performed over the phone verses face to face.     The patient has been notified of following:     \"This telephone visit will be conducted via a call between you and your physician/provider. We have found that certain health care needs can be provided without the need for a physical exam.  This service lets us provide the care you need with a short phone conversation.  If a prescription is necessary we can send it directly to your pharmacy.  If lab work is needed we can place an order for that and you can then stop by our lab to have the test done at a later time.    If during the course of the call the physician/provider feels a telephone visit is not appropriate, you will not be charged for this service.\"     Mark Teran complains of    Chief Complaint   Patient presents with     RECHECK       I have reviewed and updated the patient's Past Medical History, Social History, Family History and Medication List.    ALLERGIES  Patient has no known allergies.    Additional provider notes:    Mark Teran is a 31 year old male who was seen in clinic about 2 weeks ago, he reports his back pain is significantly improved he does have some slight tightness.  He has returned to work and his work is requiring a specific workability letter.  He denies any radicular symptoms.  He feels nearly back to his baseline which is some stiffness in his low back.  He states he is able to do all of his work duties without issues.    Assessment    Mostly resolved back spasm    Plan:    Patient may return to work, work note was given.  " Follow-up as needed.    Phone call duration: 5 minutes  Start Time 145  End Time 150    Kira Verdugo PA-C    Patient provided verbal consent for the phone visit today.       Again, thank you for allowing me to participate in the care of your patient.        Sincerely,        Kira Verdugo PA-C

## 2022-03-23 ENCOUNTER — PATIENT OUTREACH (OUTPATIENT)
Dept: CARE COORDINATION | Facility: CLINIC | Age: 32
End: 2022-03-23
Payer: COMMERCIAL

## 2022-03-28 NOTE — PROGRESS NOTES
Clinic Care Coordination Contact  [facility] [email or web address]       Clinical Data: Care Coordinator Outreach  Outreach attempted x 1.  Left message on patient's voicemail with call back information and requested return call.  Plan: Care Coordinator will try to reach patient again in 3-5 business days.      SARINA Lucero  Clinic Care Coordinator  [facility] [place]  [phone]  [email or web address]

## 2022-04-06 ENCOUNTER — PATIENT OUTREACH (OUTPATIENT)
Dept: CARE COORDINATION | Facility: CLINIC | Age: 32
End: 2022-04-06
Payer: COMMERCIAL

## 2022-04-06 NOTE — PROGRESS NOTES
Clinic Care Coordination Contact  [facility]/Voicemail       Clinical Data: Care Coordinator Outreach  Outreach attempted x 1.  Left message on patient's voicemail with call back information and requested return call.  Plan:  Care Coordinator will try to reach patient again in 10 business days.    Susan Jacobsen [facility] Care Coordination  Tel: [phone]

## 2022-04-21 ENCOUNTER — PATIENT OUTREACH (OUTPATIENT)
Dept: CARE COORDINATION | Facility: CLINIC | Age: 32
End: 2022-04-21
Payer: COMMERCIAL

## 2022-04-21 NOTE — PROGRESS NOTES
Clinic Care Coordination Contact  [facility]/Voicemail       Clinical Data: Care Coordinator Outreach  Outreach attempted x 3.  Left message on patient's voicemail with call back information and requested return call.  Plan: . Care Coordinator will do no further outreaches at this time.    Susan Jacobsen [facility] Care Coordination  Tel: [phone]

## 2023-02-03 DIAGNOSIS — F31.0 BIPOLAR AFFECTIVE DISORDER, CURRENT EPISODE HYPOMANIC (H): ICD-10-CM

## 2023-02-03 RX ORDER — QUETIAPINE FUMARATE 200 MG/1
200 TABLET, FILM COATED ORAL EVERY EVENING
Qty: 30 TABLET | Refills: 0 | Status: SHIPPED | OUTPATIENT
Start: 2023-02-03 | End: 2023-02-10

## 2023-02-03 NOTE — TELEPHONE ENCOUNTER
Medication Question or Refill    Contacts       Type Contact Phone/Fax    02/03/2023 12:12 PM CST Phone (Incoming) Mark Teran (Self) [phone] (H)          What medication are you calling about (include dose and sig)?: QUEtiapine (SEROQUEL) 200 MG tablet    Preferred Pharmacy:  [facility] [address]        Controlled Substance Agreement on file:   CSA -- Patient Level:    CSA: None found at the patient level.       Who prescribed the medication?: Dr dowd    Do you need a refill? Yes    When did you use the medication last? Last night 02/02/23    Patient offered an appointment? Yes:    Do you have any questions or concerns?  No, But pt is worried about running out      Okay to leave a detailed message?: Yes at Cell number on file:    Telephone Information:   Mobile [phone]

## 2023-02-10 ENCOUNTER — VIRTUAL VISIT (OUTPATIENT)
Dept: INTERNAL MEDICINE | Facility: CLINIC | Age: 33
End: 2023-02-10
Payer: COMMERCIAL

## 2023-02-10 DIAGNOSIS — Z79.899 LONG TERM CURRENT USE OF ANTIPSYCHOTIC MEDICATION: ICD-10-CM

## 2023-02-10 DIAGNOSIS — F31.0 BIPOLAR AFFECTIVE DISORDER, CURRENT EPISODE HYPOMANIC (H): Primary | ICD-10-CM

## 2023-02-10 PROCEDURE — 99214 OFFICE O/P EST MOD 30 MIN: CPT | Mod: VID | Performed by: INTERNAL MEDICINE

## 2023-02-10 RX ORDER — QUETIAPINE FUMARATE 200 MG/1
200 TABLET, FILM COATED ORAL EVERY EVENING
Qty: 90 TABLET | Refills: 1 | Status: SHIPPED | OUTPATIENT
Start: 2023-02-10 | End: 2023-05-08

## 2023-02-10 NOTE — PROGRESS NOTES
Mark is a 32 year old who is being evaluated via a billable video visit.      How would you like to obtain your AVS? MyChart  If the video visit is dropped, the invitation should be resent by: Text to cell phone: [phone]  Will anyone else be joining your video visit? No     Assessment & Plan   Bipolar affective disorder, current episode hypomanic (H)  Seroquel helping but it does not sound like his bipolar is adequately controlled which Mark agrees with today. He is interested in establishing with a psychiatrist (has had insurance difficulties in past), referral placed. Refilled Seroquel in interim.  - QUEtiapine (SEROQUEL) 200 MG tablet; Take 1 tablet (200 mg) by mouth every evening NEED BLOOD WORK DONE BEFORE ADDITIONAL REFILLS  - Adult Mental Health  Referral; Future    Long term current use of antipsychotic medication  Surveillance labs ordered. Patient to make fasting lab-only appt in coming months to get these done.  - Lipid panel reflex to direct LDL Fasting; Future  - Hemoglobin A1c; Future    F/u in coming weeks with fasting labs, needs in-person appt in next 6-12 months    Lior Paredes MD  [facility]    Subjective   Mark is a 32 year old who presents for a virtual video visit with chief concern of: med check. Taking Seroquel for bipolar. He tells me he's had a fair amount of difficulty obtaining health insurance over the last year. Feels Seroquel is still helping a bit but reports he continues to have hypomanic/manic episodes. Denies side effects. Has not found psychiatry provider due to insurance difficulties.    Review of Systems   Constitutional, psych systems are negative, except as otherwise noted.      Objective       Vitals: No vitals were obtained today due to virtual visit.    Physical Exam   GENERAL: Healthy, alert and no distress  EYES: Eyes grossly normal to inspection.  No discharge or erythema, or obvious scleral/conjunctival  abnormalities.  RESP: No audible wheeze, cough, or visible cyanosis.  No visible retractions or increased work of breathing.    SKIN: Visible skin clear. No significant rash, abnormal pigmentation or lesions.  NEURO: Cranial nerves grossly intact.  Mentation and speech appropriate for age.  PSYCH: Mentation appears normal, affect normal/bright, judgement and insight intact, normal speech and appearance well-groomed.    Video-Visit Details  Type of service: Video Visit   Video Start Time: 1:06 PM  Video End Time: 1:14 PM  Originating Location (pt. Location): Home  Distant Location (provider location):  Off-site  Platform used for Video Visit: [employer]

## 2023-02-20 ENCOUNTER — TELEPHONE (OUTPATIENT)
Dept: PSYCHIATRY | Facility: CLINIC | Age: 33
End: 2023-02-20
Payer: COMMERCIAL

## 2023-02-20 NOTE — TELEPHONE ENCOUNTER
PSYCHIATRY CLINIC PHONE INTAKE     SERVICES REQUESTED / INTERESTED IN          Med Management    Presenting Problem and Brief History                              What would you like to be seen for? (brief description):    Pt was diagnosed at 18 with depression, anxiety, and panic disorder. He was diagnosed with Bi-polar 8 years ago. Pt is looking for diagnostic clarification. He believes he has ADHD but his past provider feel like they are symptoms of bi-polar. Pt doesn't want to take medications for ADHD. He also stated he has anxiety and it prevents him from being able to function normally and interact socially. Pt enjoys socialies with friends in safe settings, and feels like it energizes him. Pt has had panic attacks with physical symptoms of blurry vision and heart palpitations. Pt tried not to use medications as much as possible. He currently takes generic seroquel 200mg (de notices when he doesn't take it). He was suppose to take it temporarily, but has dealt with a lot of insurance changes and hasn't been able to get consistent care. When taking medication properly, he has not issues with sleep or appetite. Lately, he's been having vivid dreams (he was decreasing his dosage to make it last longer, but has stopped).      Have you received a mental health diagnosis? Yes   Which one (s): Bi-polar, depression, anxiety, panic disorder  Is there any history of developmental delay?  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  Pt states he doesn't process information well and has a hard time retaining information, unless he's hyper-fixed on it.   Are you currently seeing a mental health provider?  No            Who / month last seen:  NA  Do you have mental health records elsewhere?  Yes - [facility]  Will you sign a release so we can obtain them?  Yes    Have you ever been hospitalized for psychiatric reasons?  No  Describe:  NA    Do you have current thoughts of self-harm?  No - No  self harming thoughts, but has thoughts of self doubt and despair. He describes them as runaway thoughts.    Do you currently have thoughts of harming others?  No    Do you have any safety concerns? No   If yes to these, offer to reach out to a  for follow up.      Substance Use History     Do you have any history of alcohol / illicit drug use?  Yes  Describe:  Marijuana - Uses for anxiety. Pt used to abuse alcohol on a regular bases. He went to meeting for it. Once he started his meetings he stopped abusing alcohol, and started using marijuana.   Have you ever received treatment for this?  Yes    Describe:  [facility] [place]     Social History     Who is the patient's a guardian?  No    Name / number: NA  Have you had an ACT team in last 12 months?  No  Describe: NA   OK to leave a detailed voicemail?  Yes    Would you be interested in learning more about research opportunities for which you or your child may qualify? We can connect you with a team member for more information.  Yes  If yes, send an inbasket message to Tina Gaffney    Medical/ Surgical History                                   Patient Active Problem List   Diagnosis     Bipolar disorder (H)     Marijuana use, episodic     Tobacco use disorder          Medications             Current Outpatient Medications   Medication Sig Dispense Refill     desonide (DESOWEN) 0.05 % external cream APPLY TWICE DAILY TO [facility], 3 WEEKS ON, 1 WEEK OF. REPEAT AS NEEDED       QUEtiapine (SEROQUEL) 200 MG tablet Take 1 tablet (200 mg) by mouth every evening NEED BLOOD WORK DONE BEFORE ADDITIONAL REFILLS 90 tablet 1         DISPOSITION      2/20/23 Intake complete. Scheduled for JANET laureano/  on 5/8/23 at 8:30am.     Kristen Miller Sr. - Lead

## 2023-03-07 DIAGNOSIS — F31.0 BIPOLAR AFFECTIVE DISORDER, CURRENT EPISODE HYPOMANIC (H): ICD-10-CM

## 2023-03-07 RX ORDER — QUETIAPINE FUMARATE 200 MG/1
200 TABLET, FILM COATED ORAL EVERY EVENING
Qty: 90 TABLET | Refills: 1 | Status: CANCELLED | OUTPATIENT
Start: 2023-03-07

## 2023-03-07 NOTE — TELEPHONE ENCOUNTER
This was refilled in Feb for 6 months. Patient is due for lab work prior to any additional refills, though it appears he's establishing with psychiatry in 2 months.

## 2023-03-07 NOTE — TELEPHONE ENCOUNTER
Patient called back. No additional refill needed. The pharmacy just called him and said Rx is read to be picked up. He does plan to see psychiatry.

## 2023-03-21 ENCOUNTER — TELEPHONE (OUTPATIENT)
Dept: INTERNAL MEDICINE | Facility: CLINIC | Age: 33
End: 2023-03-21

## 2023-03-21 ENCOUNTER — LAB (OUTPATIENT)
Dept: LAB | Facility: CLINIC | Age: 33
End: 2023-03-21
Payer: COMMERCIAL

## 2023-03-21 ENCOUNTER — DOCUMENTATION ONLY (OUTPATIENT)
Dept: LAB | Facility: CLINIC | Age: 33
End: 2023-03-21

## 2023-03-21 NOTE — TELEPHONE ENCOUNTER
----- Message from Julita Villavicencio CMA sent at 3/21/2023 12:05 PM CDT -----  Regarding: lab orders  Hello,  Patient was just in for labs. He just walked in without a lab appointment. I asked th patient if he had anything to eat and he said he did. I told him the labs that were ordered were fasting labs.     Mark would like if a nurse of Dr. Lan himself will contact him and let him know if he needs to be fasting for his labs.     Thank you!!    Julita BERRIOS

## 2023-03-23 DIAGNOSIS — F31.0 BIPOLAR AFFECTIVE DISORDER, CURRENT EPISODE HYPOMANIC (H): ICD-10-CM

## 2023-03-23 RX ORDER — QUETIAPINE FUMARATE 200 MG/1
200 TABLET, FILM COATED ORAL EVERY EVENING
Qty: 90 TABLET | Refills: 1 | Status: CANCELLED | OUTPATIENT
Start: 2023-03-23

## 2023-03-24 NOTE — TELEPHONE ENCOUNTER
Called and spoke to the patient. Patient states he is not needing a refill of this medication. Patient states he still has about 2 month supplies of this medication. Patient is establishing care with psychiatry on 5/8/23. At that time, patient may have his medications adjusted.    Will refuse refill request to the pharmacy.    Shawna Hardin RN

## 2023-03-24 NOTE — TELEPHONE ENCOUNTER
Please see 3/7/23 encounter. I'm not sure why we keep getting refill requests for this medication for this patient.

## 2023-04-03 ENCOUNTER — LAB (OUTPATIENT)
Dept: LAB | Facility: CLINIC | Age: 33
End: 2023-04-03
Payer: COMMERCIAL

## 2023-04-03 DIAGNOSIS — Z79.899 LONG TERM CURRENT USE OF ANTIPSYCHOTIC MEDICATION: ICD-10-CM

## 2023-04-03 LAB
CHOLEST SERPL-MCNC: 224 MG/DL
HBA1C MFR BLD: 5.2 % (ref 0–5.6)
HDLC SERPL-MCNC: 40 MG/DL
LDLC SERPL CALC-MCNC: 169 MG/DL
NONHDLC SERPL-MCNC: 184 MG/DL
TRIGL SERPL-MCNC: 76 MG/DL

## 2023-04-03 PROCEDURE — 83036 HEMOGLOBIN GLYCOSYLATED A1C: CPT

## 2023-04-03 PROCEDURE — 80061 LIPID PANEL: CPT

## 2023-04-03 PROCEDURE — 36415 COLL VENOUS BLD VENIPUNCTURE: CPT

## 2023-04-16 ENCOUNTER — HEALTH MAINTENANCE LETTER (OUTPATIENT)
Age: 33
End: 2023-04-16

## 2023-05-07 ASSESSMENT — PATIENT HEALTH QUESTIONNAIRE - PHQ9
SUM OF ALL RESPONSES TO PHQ QUESTIONS 1-9: 4
10. IF YOU CHECKED OFF ANY PROBLEMS, HOW DIFFICULT HAVE THESE PROBLEMS MADE IT FOR YOU TO DO YOUR WORK, TAKE CARE OF THINGS AT HOME, OR GET ALONG WITH OTHER PEOPLE: NOT DIFFICULT AT ALL
SUM OF ALL RESPONSES TO PHQ QUESTIONS 1-9: 4

## 2023-05-07 ASSESSMENT — ANXIETY QUESTIONNAIRES
6. BECOMING EASILY ANNOYED OR IRRITABLE: MORE THAN HALF THE DAYS
GAD7 TOTAL SCORE: 16
3. WORRYING TOO MUCH ABOUT DIFFERENT THINGS: NEARLY EVERY DAY
GAD7 TOTAL SCORE: 16
GAD7 TOTAL SCORE: 16
2. NOT BEING ABLE TO STOP OR CONTROL WORRYING: MORE THAN HALF THE DAYS
4. TROUBLE RELAXING: MORE THAN HALF THE DAYS
5. BEING SO RESTLESS THAT IT IS HARD TO SIT STILL: MORE THAN HALF THE DAYS
7. FEELING AFRAID AS IF SOMETHING AWFUL MIGHT HAPPEN: NEARLY EVERY DAY
7. FEELING AFRAID AS IF SOMETHING AWFUL MIGHT HAPPEN: NEARLY EVERY DAY
IF YOU CHECKED OFF ANY PROBLEMS ON THIS QUESTIONNAIRE, HOW DIFFICULT HAVE THESE PROBLEMS MADE IT FOR YOU TO DO YOUR WORK, TAKE CARE OF THINGS AT HOME, OR GET ALONG WITH OTHER PEOPLE: SOMEWHAT DIFFICULT
1. FEELING NERVOUS, ANXIOUS, OR ON EDGE: MORE THAN HALF THE DAYS
8. IF YOU CHECKED OFF ANY PROBLEMS, HOW DIFFICULT HAVE THESE MADE IT FOR YOU TO DO YOUR WORK, TAKE CARE OF THINGS AT HOME, OR GET ALONG WITH OTHER PEOPLE?: SOMEWHAT DIFFICULT

## 2023-05-08 ENCOUNTER — VIRTUAL VISIT (OUTPATIENT)
Dept: PSYCHIATRY | Facility: CLINIC | Age: 33
End: 2023-05-08
Attending: PSYCHIATRY & NEUROLOGY
Payer: COMMERCIAL

## 2023-05-08 DIAGNOSIS — F41.1 GENERALIZED ANXIETY DISORDER: ICD-10-CM

## 2023-05-08 DIAGNOSIS — F33.40 RECURRENT MAJOR DEPRESSIVE DISORDER, IN REMISSION (H): Primary | ICD-10-CM

## 2023-05-08 DIAGNOSIS — F40.10 SOCIAL ANXIETY DISORDER: ICD-10-CM

## 2023-05-08 DIAGNOSIS — F31.0 BIPOLAR AFFECTIVE DISORDER, CURRENT EPISODE HYPOMANIC (H): ICD-10-CM

## 2023-05-08 PROCEDURE — 90792 PSYCH DIAG EVAL W/MED SRVCS: CPT | Mod: VID | Performed by: STUDENT IN AN ORGANIZED HEALTH CARE EDUCATION/TRAINING PROGRAM

## 2023-05-08 RX ORDER — QUETIAPINE FUMARATE 200 MG/1
200 TABLET, FILM COATED ORAL EVERY EVENING
Qty: 90 TABLET | Refills: 1 | Status: SHIPPED | OUTPATIENT
Start: 2023-05-08 | End: 2023-06-06

## 2023-05-08 RX ORDER — ESCITALOPRAM OXALATE 5 MG/1
5 TABLET ORAL DAILY
Qty: 60 TABLET | Refills: 0 | Status: SHIPPED | OUTPATIENT
Start: 2023-05-08 | End: 2023-06-06

## 2023-05-08 NOTE — PATIENT INSTRUCTIONS
**For crisis resources, please see the information at the end of this document**   Patient Education    Thank you for coming to the [facility] [place] [facility].     Lab Testing:  If you had lab testing today and your results are reassuring or normal they will be mailed to you or sent through [employer] within 7 days. If the lab tests need quick action we will call you with the results. The phone number we will call with results is # [phone]. If this is not the best number please call our clinic and change the number.     Medication Refills:  If you need any refills please call your pharmacy and they will contact us. Our fax number for refills is [phone].   Three business days of notice are needed for general medication refill requests.   Five business days of notice are needed for controlled substance refill requests.   If you need to change to a different pharmacy, please contact the new pharmacy directly. The new pharmacy will help you get your medications transferred.     Contact Us:  Please call [phone] during business hours (8-5:00 M-F).   If you have medication related questions after clinic hours, or on the weekend, please call [phone].     Financial Assistance [phone]   Medical Records [phone]       MENTAL HEALTH CRISIS RESOURCES:  For a emergency help, please call 911 or go to the nearest Emergency Department.     Emergency Walk-In Options:   EmPATH Unit @ [facility] ([place]): [phone] - Specialized mental health emergency area designed to be calming  [facility] ([place]): [phone]  [facility] ([place]): [phone]  [facility]): [phone]    [facility] Crisis Information:   Black: [phone]  [place]: [phone]  [place] ([place]) - Adult: [phone]     Child: [phone]  [place] - Adult: [phone]     Child: [phone]  [place]:  [phone]  List of all [facility] resources:   https://[place].gov/dhs/people-we-serve/adults/health-care/mental-health/resources/crisis-contacts.jsp    National Crisis Information:   Crisis Text Line: Text  [place]  to 724523  Suicide & Crisis Lifeline: 988  National Suicide Prevention Lifeline: 4-472-621-TALK (1-852.692.8289)       For online chat options, visit https://suicidepreventionlifeline.org/chat/  Poison Control Center: 5-407-187-9498  Trans Lifeline: 3-028-691-2376 - Hotline for transgender people of all ages  The [employer]: 6-797-627-1270 - Hotline for LGBT youth     For Non-Emergency Support:   Fast Tracker: Mental Health & Substance Use Disorder Resources -   https://www.[facility] [facility].org/

## 2023-05-08 NOTE — NURSING NOTE
Is the patient currently in the state of [place]? YES    Visit mode:VIDEO    If the visit is dropped, the patient can be reconnected by: VIDEO VISIT: Text to cell phone: [phone]    Will anyone else be joining the visit? NO      How would you like to obtain your AVS? MyChart    Are changes needed to the allergy or medication list? NO    Reason for visit: Video Visit      Miya ZUNIGA

## 2023-05-08 NOTE — PROGRESS NOTES
Virtual Visit Details    Type of service:  Video Visit   Video Start Time: 8:30 AM  Video End Time:10:04 AM    Originating Location (pt. Location): Home  Distant Location (provider location):  On-site  Platform used for Video Visit: [facility]  Psychiatry Clinic  NEW PATIENT EVALUATION     CARE TEAM:  PCP- Lior Lan    Psychotherapist- None     Mark Teran is a 32 year old who uses the name Mark and pronouns he, him.      DIAGNOSIS   Social Anxiety   JUDITH   MDD, recurrent, in remission currently      ASSESSMENT   Mark is struggling with anxiety .   Issues addressed today include:    -anxiety: significant burden, worrying constantly, feelings of inferiority and mistrust with people. Has had a few panic attacks (most recently in setting of stopping cannabis). Primarily contributes to difficulty with sleeping (ruminations and worries). Does not take Seroquel consistently and it knocks him out. Patient has not tried selective serotonin reuptake inhibitor in the past and would likely benefit form this. We discussed benefits/risks of this medication and will start this today.     MNPMP review was not needed today.     Future considerations;   - taper off of Seroquel   - try mirtazapine      PLAN                                                                                                                1) Meds-  - Seroquel 200 mg at bedtime   - START Lexapro 5 mg     2) Psychotherapy- start CBT for anxiety and emotional regulation     3) Next due-  Labs- 4 weeks  EKG- prn   Rating scales- AIMS: due at next visit     4) Referrals- Therapy- CBT for mood regulation and anxiety     5) Other- RNCC/provider outreach call in 14 days    6) Dispo- 4 weeks      PERTINENT BACKGROUND                                                    [most recent eval 05/08/23]   Mark first experienced anxiety for most of his life    Childhood, until age 19 did not know his father because  "parents . Grew up with with IEP for LD with aggression/fights at school, depressive episodes in adolescence with SI and SIB. No hospitalizations, has been inconsistent with medications and  care until recently.   Describes difficulty with social queues that result in frustration and anxiety that he identifies with mild autism - no psychometric testing.     Pertinent Items Include: suicide attempt , suicidal ideation, SIB , aggression and substance use: cannabis     SUBJECTIVE     Most recent history began in adolescence    - hyper-fixates on new hobbies for 8-10 hours and then loses interest; \"I haven't ruled out ADHD\" he felt like the doctor treated him like someone who was \"just there for meds\"   - says when he doesn't take Seroquel he gets restless and doesn't sleep and focuses on something for 8-10 hours   - lays in bed at night worrying about falling asleep and feeling frustrated    - \"constant fear that no one is actually my friend and that no one actually likes me, and the moment I am not beneficial they will disappear\" he says he over-analyzes everything in his head   - worries that he doesn't know what he's supposed to do all the time   - don't want coworkers to know who I am, fear that they will  him, he wonders if he has autism  - says he has an issue with anger \"my trigger is when a series of stupid clumsy actions set me off\"   - intermittent nights of not being able to sleep   - being mad at being mad, says he agrees with being unable to control emotions  - says cannabis helps with sleep, has been using since adolescence    - age 19 father got back together with his mom, was traumatic for him, resulting in aggressive threats towards people and felony charge -> long term   - doesn't agree with diagnosis of bipolar, feels like anxiety is primary burden     Recent Psych Symptoms:   Depression:  feeling trapped and dysregulation  Elevated:  distractibility  and dysregulation  Psychosis:  " "none  Anxiety:  panic attacks [hyperventilating, fingers go numb, blood pressure elevates], excessive worry, feeling fearful and social anxiety  Trauma Related:  none  Sleep: good now  Other: feels unaware of social cues often     Recent Substance Use:     -alcohol: Yes: 3 nights a week drinks socially with D&D group, only 1-2 drinks    -cannabis: Yes: smokes cannabis several times daily    -tobacco: No  -caffeine:  Yes: 2 coffees daily   -opioids: No   Narcan Kit currrent: No   -other: none    Pertinent Negatives: No suicidal or violent ideation, psychosis, delusions, kayla and harmful substance use  Adverse Effects:   denies     FAMILY and SOCIAL HISTORY                                 pt reported     Family History: father - anxiety, grandmother - cocaine, mother - AUD, uncle - OUD     Social History:    Living Situation - rents house, has two roommates, lives with ila in [place].    Finances- works as  for food.    Social/ Spiritual Support- childhood friend/roommate, nghia, his pets, his father.    Other - Had IEP for learning disability in school and had issues with fighting and getting teased, had counseling session with high school counselor in .   Legal - went to senior living for terrorist threats to family and was on probation for 4 years      PAST PSYCHIATRIC HISTORY     SIB- yes, cuts arms   Suicide Attempt [#, most recent]- Yes: tried to \"collapse my veins\" but it didn't work    Suicidal Ideation Hx- Yes: gestures and preparations including standing on train tracks, wrapping cord around his neck    Violence/Aggression Hx- No   Psychosis Hx- No   Eating Disorder Hx- No   Other- none   Psych Hosp [#, most recent]- No   Commitment- No   TMS/ECT- No   Outpatient Programs - Yes: outpatient psychotherapy with Georgette Castillo    Other - none      PAST MED TRIALS      Medication Max Dose (mg) Dates / Duration Helpful? DC Reason / Adverse Effects?   Celexa  20    Inadequate trial     Seroquel  " 200    Helps w sleep    Buspar     Sexual side effects              PAST SUBSTANCE USE HISTORY     Past Use-   -alcohol: Yes: period of time drinking every night (6-12 beers every night) 2 years ago when on probation and could not use cannabis   -cannabis: Yes: smokes cannabis 2-3 x daily    -tobacco: Yes: 10 years ago stopped   -caffeine:  Yes: up to 5 energy drinks in adolescence    -opioids: No   Narcan Kit current: No   -other: [facility]    Treatment- #, most recent- No   Medical Consequences- No   Legal Consequences- No   Other- none     MEDICAL HISTORY and ALLERGY     ALLERGIES: Aspartame    Patient Active Problem List   Diagnosis     Bipolar disorder (H)     Marijuana use, episodic     Tobacco use disorder        MEDICAL REVIEW OF SYSTEMS   Contraception-  Unknown   Pregnant- N/A  none in addition to that documented above     MEDICATIONS     Current Outpatient Medications   Medication Sig Dispense Refill     desonide (DESOWEN) 0.05 % external cream APPLY TWICE DAILY TO LSC, 3 WEEKS ON, 1 WEEK OF. REPEAT AS NEEDED       QUEtiapine (SEROQUEL) 200 MG tablet Take 1 tablet (200 mg) by mouth every evening NEED BLOOD WORK DONE BEFORE ADDITIONAL REFILLS 90 tablet 1      VITALS   There were no vitals taken for this visit.    MENTAL STATUS EXAM     Alertness: alert  and oriented  Appearance: adequately groomed  Behavior/Demeanor: cooperative and pleasant, with good  eye contact   Speech: normal and regular rate and rhythm  Language: intact and no problems  Psychomotor: normal or unremarkable  Mood: anxious  Affect: full range and appropriate; congruent to: mood- yes, content- yes  Thought Process/Associations: unremarkable  Thought Content:  Reports none;  Denies suicidal & violent ideation and delusions  Perception:  Reports none;  Denies hallucinations  Insight: good  Judgment: good  Cognition: does  appear grossly intact; formal cognitive testing was not done  Gait and Station: N/A (telehealth)     LABS and DATA          9/20/2021    10:20 AM 5/7/2023     9:28 AM   PHQ   PHQ-9 Total Score 13 4   Q9: Thoughts of better off dead/self-harm past 2 weeks Not at all Not at all       No lab results found.  No lab results found.    ECG 2008  QTc = 403 ms     PSYCHOTROPIC DRUG INTERACTIONS                                           PSYCHCLINICDDI   ADDITIVE QTc: Seroquel and Lexapro      MANAGEMENT:  Monitoring for adverse effects and patient is aware of risks     RISK STATEMENT for SAFETY     Mark did not appear to be an imminent safety risk to self or others.    TREATMENT RISK STATEMENT: The risks, benefits, alternatives and potential adverse effects have been discussed and are understood by the pt. The pt understands the risks of using street drugs or alcohol. There are no medical contraindications, the pt agrees to treatment with the ability to do so. The pt knows to call the clinic for any problems or to access emergency care if needed.  Medical and substance use concerns are documented above.  Psychotropic drug interaction check was done, including changes made today.     PROVIDER: Sven Jaimes, DO    Patient staffed in clinic with Dr. Garcia who will sign the note.  Supervisor is Dr. Lee.

## 2023-06-06 ENCOUNTER — TELEPHONE (OUTPATIENT)
Dept: PSYCHIATRY | Facility: CLINIC | Age: 33
End: 2023-06-06
Payer: COMMERCIAL

## 2023-06-06 ENCOUNTER — VIRTUAL VISIT (OUTPATIENT)
Dept: PSYCHIATRY | Facility: CLINIC | Age: 33
End: 2023-06-06
Attending: PSYCHIATRY & NEUROLOGY
Payer: COMMERCIAL

## 2023-06-06 DIAGNOSIS — F41.1 GENERALIZED ANXIETY DISORDER: Primary | ICD-10-CM

## 2023-06-06 DIAGNOSIS — F31.0 BIPOLAR AFFECTIVE DISORDER, CURRENT EPISODE HYPOMANIC (H): ICD-10-CM

## 2023-06-06 DIAGNOSIS — F33.40 RECURRENT MAJOR DEPRESSIVE DISORDER, IN REMISSION (H): ICD-10-CM

## 2023-06-06 PROCEDURE — 99214 OFFICE O/P EST MOD 30 MIN: CPT | Mod: VID | Performed by: STUDENT IN AN ORGANIZED HEALTH CARE EDUCATION/TRAINING PROGRAM

## 2023-06-06 RX ORDER — ESCITALOPRAM OXALATE 5 MG/1
5 TABLET ORAL DAILY
Qty: 60 TABLET | Refills: 1 | Status: SHIPPED | OUTPATIENT
Start: 2023-06-06 | End: 2023-08-21 | Stop reason: ALTCHOICE

## 2023-06-06 RX ORDER — QUETIAPINE FUMARATE 150 MG/1
150 TABLET, FILM COATED ORAL EVERY EVENING
Qty: 90 TABLET | Refills: 0 | Status: SHIPPED | OUTPATIENT
Start: 2023-06-06 | End: 2023-06-19

## 2023-06-06 NOTE — NURSING NOTE
Is the patient currently in the state of [place]? YES    Visit mode:VIDEO    If the visit is dropped, the patient can be reconnected by: VIDEO VISIT: Text to cell phone: [phone]    Will anyone else be joining the visit? NO      How would you like to obtain your AVS? MyChart    Are changes needed to the allergy or medication list? NO    PHQ was not assigned, not done per department protocol.    Reason for visit: BALDO Valero VF

## 2023-06-06 NOTE — TELEPHONE ENCOUNTER
Prior Authorization Retail Medication Request    Medication/Dose: Quetiapine   ICD code (if different than what is on RX):    Bipolar affective disorder, current episode hypomanic (H) [F31.0]    Previously Tried and Failed:       PAST MED TRIALS       Medication Max Dose (mg) Dates / Duration Helpful? DC Reason / Adverse Effects?   Celexa  20      Inadequate trial     Seroquel  200      Helps w sleep    Buspar        Sexual side effects                   Rationale:  Patient was on Seroquel 200 mg . Given that he also experiences sedation and metabolic effects on Seroquel, will start to slowly taper off of this medication while optimizing Lexapro and monitoring for hypomanic symptoms (discussed what these would look like today and how to get help if he experiences these things, which he agreed to).    Insurance Name: Primary : [facility]  Secondary : [employer]  Insurance ID:  [id number]      Pharmacy Information (if different than what is on RX)  Name:  [id number]   [facility] [address]    Phone:  [phone]    PA is needed per pharmacy. Drug is non-formulary.      Valarie Sun on 6/6/2023 at 11:48 AM

## 2023-06-06 NOTE — PATIENT INSTRUCTIONS
**For crisis resources, please see the information at the end of this document**   Patient Education    Thank you for coming to the [facility].     Lab Testing:  If you had lab testing today and your results are reassuring or normal they will be mailed to you or sent through [employer] within 7 days. If the lab tests need quick action we will call you with the results. The phone number we will call with results is # [phone]. If this is not the best number please call our clinic and change the number.     Medication Refills:  If you need any refills please call your pharmacy and they will contact us. Our fax number for refills is [phone].   Three business days of notice are needed for general medication refill requests.   Five business days of notice are needed for controlled substance refill requests.   If you need to change to a different pharmacy, please contact the new pharmacy directly. The new pharmacy will help you get your medications transferred.     Contact Us:  Please call [phone] during business hours (8-5:00 M-F).   If you have medication related questions after clinic hours, or on the weekend, please call [phone].     Financial Assistance [phone]   Medical Records [phone]       MENTAL HEALTH CRISIS RESOURCES:  For a emergency help, please call 911 or go to the nearest Emergency Department.     Emergency Walk-In Options:   EmPATH Unit @ [facility] ([place]): [phone] - Specialized mental health emergency area designed to be calming  [facility] ([place]): [phone]  [facility] ([place]): [phone]  [facility]): [phone]    [facility] Crisis Information:   [place]: [phone]  [place]: [phone]  [place] ([place]) - Adult: [phone]     Child: [phone]  [place] - Adult: [phone]     Child: [phone]  [place]:  [phone]  List of all [facility] resources:   https://[place].gov/dhs/people-we-serve/adults/health-care/mental-health/resources/crisis-contacts.jsp    National Crisis Information:   Crisis Text Line: Text  [place]  to 395581  Suicide & Crisis Lifeline: 988  National Suicide Prevention Lifeline: 2-914-654-TALK (1-102.824.3280)       For online chat options, visit https://suicidepreventionlifeline.org/chat/  Poison Control Center: 6-168-671-5748  Trans Lifeline: 2-565-524-0049 - Hotline for transgender people of all ages  The Percy Project: 8-782-839-5126 - Hotline for LGBT youth     For Non-Emergency Support:   Fast Tracker: Mental Health & Substance Use Disorder Resources -   https://www.PredictivezckEtogasn.org/

## 2023-06-06 NOTE — PROGRESS NOTES
Virtual Visit Details    Type of service:  Video Visit   Video Start Time: 8:38 AM  Video End Time:9:25 AM  Originating Location (pt. Location): Home  Distant Location (provider location):  On-site  Platform used for Video Visit: [facility]  Psychiatry Clinic  MEDICAL PROGRESS NOTE     CARE TEAM:  PCP- Lior Lan    Psychotherapist- None     Mark Teran is a 32 year old who uses the name Mark and pronouns he, him.      DIAGNOSIS   Social Anxiety   JUDITH   MDD, recurrent, in remission      ASSESSMENT   Mark is doing well overall.  Issues addressed today include:    -Anxiety: Improved since last visit; notices less exhausted by social outings, more awareness of sensitivity to caffeine for anxiety. Has endorsed past issues of feelings of inferiority and mistrust with people (pt believes he is on the autism spectrum which to him explains mood dysregulation around interpersonal issues but has not had formal psych testing for this). Panic attacks improved since starting Lexapro and noticed better mood management on this medication vs Seroquel. Given that he also experiences sedation and metabolic effects on Seroquel, will start to slowly taper off of this medication while optimizing Lexapro and monitoring for hypomanic symptoms (discussed what these would look like today and how to get help if he experiences these things, which he agreed to). history of side effects to meds suggest a slow taper of Lexapro would be better. Referred to CBT today.     -MDD: stable, improved mood dysregulation since starting Lexapro. In remission at this time. Insomnia likely more related to anxiety and ruminating thoughts. No changes today.     MNPMP review was not needed today.     Future considerations;   - taper off of Seroquel   - try mirtazapine      PLAN                                                                                                                1) Meds-  - REDUCE  "Seroquel to 150 mg at bedtime   - continue Lexapro 5 mg     2) Psychotherapy- start CBT for anxiety and emotional regulation     3) Next due-  Labs- 04/2024 for annual ap labs  EKG- prn   Rating scales- AIMS: due at next visit     4) Referrals- Therapy- CBT for mood regulation and anxiety     5) Other- none    6) Dispo- 8 weeks     PERTINENT BACKGROUND                                                    [most recent eval 05/08/23]   Mark first experienced anxiety for most of his life    Childhood, until age 19 did not know his father because parents . Grew up with with IEP for LD with aggression/fights at school, depressive episodes in adolescence with SI and SIB. No hospitalizations, has been inconsistent with medications and  care until recently.   Describes difficulty with social queues that result in frustration and anxiety that he identifies with mild autism - no psychometric testing.     Pertinent Items Include: suicide attempt , suicidal ideation, SIB , aggression and substance use: cannabis     SUBJECTIVE     Since last visit:  - still playing D&D, trying to get full time hours at work as   - noticed small improvement in anxiety and mood; has not had depressed episode since last visit   - was able to \"recharge my social battery\" a lot faster; discussed that is a sign that depression is getting better   - cuddling his cat and felt way better than typical     Recent Psych Symptoms:   Depression:  none  Elevated:  distractibility   Psychosis:  none  Anxiety:  panic attacks [hyperventilating, fingers go numb, blood pressure elevates], excessive worry, feeling fearful and social anxiety  Trauma Related:  none  Sleep: good now  Other: feels unaware of social cues often     Recent Substance Use:     -alcohol: Yes: 3 nights a week drinks socially with D&D group, only 1-2 drinks    -cannabis: Yes: smokes cannabis several times daily    -tobacco: No  -caffeine:  Yes: 2 coffees daily "   -opioids: No   Narcan Kit currrent: No   -other: none    Pertinent Negatives: No suicidal or violent ideation, psychosis, delusions, kayla and harmful substance use  Adverse Effects:   denies     PAST MED TRIALS      Medication Max Dose (mg) Dates / Duration Helpful? DC Reason / Adverse Effects?   Celexa  20    Inadequate trial     Seroquel  200    Helps w sleep    Buspar     Sexual side effects              MEDICAL HISTORY and ALLERGY     ALLERGIES: Aspartame    Patient Active Problem List   Diagnosis     Bipolar disorder (H)     Marijuana use, episodic     Tobacco use disorder        MEDICAL REVIEW OF SYSTEMS   Contraception-  Unknown   Pregnant- N/A  none in addition to that documented above     MEDICATIONS     Current Outpatient Medications   Medication Sig Dispense Refill     desonide (DESOWEN) 0.05 % external cream APPLY TWICE DAILY TO LSC, 3 WEEKS ON, 1 WEEK OF. REPEAT AS NEEDED       escitalopram (LEXAPRO) 5 MG tablet Take 1 tablet (5 mg) by mouth daily 60 tablet 0     QUEtiapine (SEROQUEL) 200 MG tablet Take 1 tablet (200 mg) by mouth every evening NEED BLOOD WORK DONE BEFORE ADDITIONAL REFILLS 90 tablet 1      VITALS   There were no vitals taken for this visit.      MENTAL STATUS EXAM     Alertness: alert  and oriented  Appearance: adequately groomed  Behavior/Demeanor: cooperative and pleasant, with good  eye contact   Speech: normal and regular rate and rhythm  Language: intact and no problems  Psychomotor: normal or unremarkable  Mood: anxious  Affect: full range and appropriate; congruent to: mood- yes, content- yes  Thought Process/Associations: unremarkable  Thought Content:  Reports none;  Denies suicidal & violent ideation and delusions  Perception:  Reports none;  Denies hallucinations  Insight: good  Judgment: good  Cognition: does  appear grossly intact; formal cognitive testing was not done  Gait and Station: N/A ([facility])     LABS and DATA         9/20/2021    10:20 AM 5/7/2023     9:28  AM   PHQ   PHQ-9 Total Score 13 4   Q9: Thoughts of better off dead/self-harm past 2 weeks Not at all Not at all       No lab results found.  No lab results found.    ECG 2008  QTc = 403 ms     PSYCHOTROPIC DRUG INTERACTIONS                                           PSYCHCLINICDDI   ADDITIVE QTc: Seroquel and Lexapro      MANAGEMENT:  Monitoring for adverse effects and patient is aware of risks     RISK STATEMENT for SAFETY     Mark did not appear to be an imminent safety risk to self or others.    TREATMENT RISK STATEMENT: The risks, benefits, alternatives and potential adverse effects have been discussed and are understood by the pt. The pt understands the risks of using street drugs or alcohol. There are no medical contraindications, the pt agrees to treatment with the ability to do so. The pt knows to call the clinic for any problems or to access emergency care if needed.  Medical and substance use concerns are documented above.  Psychotropic drug interaction check was done, including changes made today.     PROVIDER: Sven Jaimes, DO    Patient staffed in clinic with Dr. Herring who will sign the note.  Supervisor is Dr. Lee.

## 2023-06-09 NOTE — TELEPHONE ENCOUNTER
Central Prior Authorization Team   Phone: [phone]      PA Initiation    Medication: QUETIAPINE FUMARATE 150 MG PO TABS  Insurance Company: [employer] ([facility]) - Phone [phone] Fax [phone]  Pharmacy Filling the Rx: [id number] [place], [place] - [address] AT [facility] [address]  Filling Pharmacy Phone: [phone]  Filling Pharmacy Fax:    Start Date: 6/9/2023

## 2023-06-16 NOTE — TELEPHONE ENCOUNTER
PRIOR AUTHORIZATION DENIED    Medication: QUETIAPINE FUMARATE 150 MG PO TABS  Insurance Company: [employer] ([facility]) - Phone [phone] Fax [phone]  Denial Date: 6/9/2023  Denial Rational:           Appeal Information:        Patient Notified: No

## 2023-06-20 DIAGNOSIS — F31.0 BIPOLAR AFFECTIVE DISORDER, CURRENT EPISODE HYPOMANIC (H): ICD-10-CM

## 2023-06-20 RX ORDER — QUETIAPINE FUMARATE 50 MG/1
150 TABLET, FILM COATED ORAL EVERY EVENING
Qty: 270 TABLET | Refills: 0 | Status: SHIPPED | OUTPATIENT
Start: 2023-06-20 | End: 2023-08-21 | Stop reason: ALTCHOICE

## 2023-06-20 RX ORDER — QUETIAPINE FUMARATE 150 MG/1
150 TABLET, FILM COATED ORAL EVERY EVENING
Qty: 90 TABLET | Refills: 0 | Status: SHIPPED | OUTPATIENT
Start: 2023-06-20 | End: 2023-06-20 | Stop reason: ALTCHOICE

## 2023-06-20 NOTE — TELEPHONE ENCOUNTER
PA Appeal for Seroquel 150 mg being worked on.      In the meantime, prescription for Seroquel 150 mg signed by Dr. Gurrola in response to a [employer] message form patient indicating that they only have 3 remaining doses.    Prescription re-signed by Dr. Salmon on behalf of Dr. Jaimes.  [facility] is managing this patient's Seroquel prescription (it is now on a taper) and a PA appeal is being worked on.

## 2023-06-20 NOTE — TELEPHONE ENCOUNTER
Per phone call to insurance (number listed on PA denial letter in 6/16 encounter)    Prescription revised to quietapine 50 mg tabs, take three every evening.    Reason:  150 mg tabs are rare and made by only one .    Insurance ran test claim for 50 mg tabs x 3 - it went through.    Pended prescription for 50 mg tabs sent to Dr. Jaimes for review.

## 2023-06-20 NOTE — TELEPHONE ENCOUNTER
Per phone call to Insurance (number listed on Appeal letter) 150 mg tablets are extremely rare and only made by one .  Test claim was run using 50 mg tabs x 3 to equal 150 mg dose - claim went through.    Pended prescription for Quetiapine 50 mg tabs, take three every evening - sent to Dr. Jaimes for review.      Note to pharmacy on rx to cancel prior orders - and to run new claim as written for 50 mg tabs.    New RX created in 6/20/23 encounter.

## 2023-06-28 ENCOUNTER — MYC MEDICAL ADVICE (OUTPATIENT)
Dept: PSYCHIATRY | Facility: CLINIC | Age: 33
End: 2023-06-28
Payer: COMMERCIAL

## 2023-06-30 ENCOUNTER — TELEPHONE (OUTPATIENT)
Dept: PSYCHIATRY | Facility: CLINIC | Age: 33
End: 2023-06-30
Payer: COMMERCIAL

## 2023-06-30 NOTE — TELEPHONE ENCOUNTER
Writer followed up with patient regarding Lexapro 5 mg refill - patient will contact pharmacy to request refill.    last fill was 5/8/23 for 60 day supply.  New rx dated 6/6/23 for 60 day supply on file at pharmacy.

## 2023-06-30 NOTE — CONFIDENTIAL NOTE
Writer spoke with patient regarding symptoms following dose increase of 150 mg to 175 mg Seroquel (in response to what what patient thought to be withdrawal symptoms when dose reduced from 200 mg to 150 mg).    Patient states that the sweaty, headache, foggy symptoms lasted for approximately one day.  He has gone up to Seroquel 175 mg and states the symptoms are gone and he feels fine.      Update sent to Dr. Jaimes.    Patient states he has four remaining doses of Lexapro.  Writer will work on refill.

## 2023-07-18 ENCOUNTER — TELEPHONE (OUTPATIENT)
Dept: PSYCHIATRY | Facility: CLINIC | Age: 33
End: 2023-07-18
Payer: COMMERCIAL

## 2023-07-18 NOTE — TELEPHONE ENCOUNTER
Writer followed up with patient regarding scheduling with new provider.  Patient indicates he is not available Wed - Fridays and needs to be paired with a provider who could see him Mondays or Tuesdays (virtual preferred).  Dr. Gentile is not available on these days.    Writer checking on availability of transferring to a new provider.

## 2023-08-03 ENCOUNTER — MYC MEDICAL ADVICE (OUTPATIENT)
Dept: PSYCHIATRY | Facility: CLINIC | Age: 33
End: 2023-08-03
Payer: COMMERCIAL

## 2023-08-21 ENCOUNTER — VIRTUAL VISIT (OUTPATIENT)
Dept: PSYCHIATRY | Facility: CLINIC | Age: 33
End: 2023-08-21
Attending: PSYCHIATRY & NEUROLOGY
Payer: COMMERCIAL

## 2023-08-21 DIAGNOSIS — R45.89 EMOTIONAL DYSREGULATION: ICD-10-CM

## 2023-08-21 DIAGNOSIS — F41.9 ANXIETY DISORDER, UNSPECIFIED TYPE: Primary | ICD-10-CM

## 2023-08-21 PROCEDURE — 99214 OFFICE O/P EST MOD 30 MIN: CPT | Mod: VID

## 2023-08-21 PROCEDURE — 90833 PSYTX W PT W E/M 30 MIN: CPT | Mod: VID

## 2023-08-21 RX ORDER — ESCITALOPRAM OXALATE 10 MG/1
TABLET ORAL
Qty: 32 TABLET | Refills: 1 | Status: SHIPPED | OUTPATIENT
Start: 2023-08-22 | End: 2023-09-19 | Stop reason: DRUGHIGH

## 2023-08-21 RX ORDER — QUETIAPINE FUMARATE 100 MG/1
100 TABLET, FILM COATED ORAL AT BEDTIME
Qty: 30 TABLET | Refills: 1 | Status: SHIPPED | OUTPATIENT
Start: 2023-08-21 | End: 2023-09-25

## 2023-08-21 ASSESSMENT — PATIENT HEALTH QUESTIONNAIRE - PHQ9
SUM OF ALL RESPONSES TO PHQ QUESTIONS 1-9: 0
10. IF YOU CHECKED OFF ANY PROBLEMS, HOW DIFFICULT HAVE THESE PROBLEMS MADE IT FOR YOU TO DO YOUR WORK, TAKE CARE OF THINGS AT HOME, OR GET ALONG WITH OTHER PEOPLE: NOT DIFFICULT AT ALL
SUM OF ALL RESPONSES TO PHQ QUESTIONS 1-9: 0

## 2023-08-21 NOTE — PATIENT INSTRUCTIONS
It was nice meeting you today. Today we discussed the followin- tapering Lexapro Up to address the anxiety. Also we discussed decreasing the Quetiapine to a lower dose. Below is a breakdown of the instructions for you to follow:        Lexapro      Seroquel    Week 1: One 10 mg tablet (total dose 10 mg) at bedtime    100 mg at bedtime  Week 2: One and a half 10 mg tablet (total dose 15 mg) at bedtime  100 mg at bedtime  Week 3: Two 10 mg tablets (total dose 20 mg) at bedtime    100 mg at bedtime  Week 4: Continue taking two 10 mg tablets (total dose 20 mg) at bedtime  100 mg at bedtime      2- I sent a referral for a neuropsychological testing at [facility]. They will reach out to you to schedule an appointment. Make sure you let them know you want to be screened for ADHD and Autism.      3- Based on what we discussed I believe you will find a type of psychotherapy immensely helpful. It is called Dialectical Behavioral Therapy. It helps with mood fluctuations, feeling frustrated and anger management. I sent a referral for our DBT program. They will reach out to you to give you more information and schedule an intake appointment.    Looking forward to seeing you in 4 weeks.    Take great care,  Dr. Gentile      **For crisis resources, please see the information at the end of this document**   Patient Education    Thank you for coming to the [facility] [place] [facility].     Lab Testing:  If you had lab testing today and your results are reassuring or normal they will be mailed to you or sent through "[employer] [facility] within 7 days. If the lab tests need quick action we will call you with the results. The phone number we will call with results is # [phone]. If this is not the best number please call our clinic and change the number.     Medication Refills:  If you need any refills please call your pharmacy and they will contact us. Our fax number for refills is [phone].   Three  business days of notice are needed for general medication refill requests.   Five business days of notice are needed for controlled substance refill requests.   If you need to change to a different pharmacy, please contact the new pharmacy directly. The new pharmacy will help you get your medications transferred.     Contact Us:  Please call [phone] during business hours (8-5:00 M-F).   If you have medication related questions after clinic hours, or on the weekend, please call [phone].     Financial Assistance [phone]   Medical Records [phone]       MENTAL HEALTH CRISIS RESOURCES:  For a emergency help, please call 911 or go to the nearest Emergency Department.     Emergency Walk-In Options:   EmPATH Unit @ [facility] ([place]): [phone] - Specialized mental health emergency area designed to be calming  [facility] ([place]): [phone]  [facility]iat[facility] ([place]): [phone]  [facility] ([place]): [phone]    [facility] Crisis Information:   [place]: [phone]  [place]: [phone]  [place] ([place]) - Adult: [phone]     Child: [phone]  [place] - Adult: [phone]     Child: [phone]  [place]: [phone]  List of all [facility] resources:   https://[place].gov/dhs/people-we-serve/adults/health-care/mental-health/resources/crisis-contacts.jsp    National Crisis Information:   Crisis Text Line: Text  [place]  to 597721  Suicide & Crisis Lifeline: 988  National Suicide Prevention Lifeline: 7-852-081-TALK (1-647.128.9078)       For online chat options, visit https://suicidepreventionlifeline.org/chat/  Poison Control Center: 1-582.798.7562  Trans Lifeline: 1-100.352.4531 - Hotline for transgender people of all ages  The Percy Project: 1-681.243.9049 - Hotline for LGBT youth     For Non-Emergency Support:   Fast Tracker: Mental Health & Substance Use Disorder Resources -   https://www.[employer].org/

## 2023-08-21 NOTE — PROGRESS NOTES
Virtual Visit Details    Originating Location (pt. Location): Home    Distant Location (provider location):  On-site  Platform used for Video Visit: Braxton

## 2023-08-21 NOTE — PROGRESS NOTES
"Virtual Visit Details    Type of service:  Video Visit   Video Start Time:  2:40 PM  Video End Time:3:13 PM    Originating Location (pt. Location): Home    Distant Location (provider location):  On-site  Platform used for Video Visit: Braxton Flores is a 33 year old who uses the pronouns he, him, his.      Chief Concern     \"I think I have autism\"     Diagnoses   General anxiety disorder  Social anxiety disorder  R/o ADHD  Unspecified Mood Disorder (diagnostic history of bipolar disorder), in remission     Assessment     Mark is a 33 year old who over the past few years has started to focus on his mental health concerns. He has a history of seeing a psychiatrist on an involuntary basis as part of a mandatory program for a few years where he was diagnosed and treated with Bipolar Disorder. He states the diagnosis never resonated with him. His concerns about mood dysregulation, impulsivity and anger management difficulties were perceived as part of Bipolar disorder. During that time he was curious to know if he had ADHD but was never screened or treated for it. Since 2023 he has learned more about Autism spectrum and this diagnosis resonates well with him.     Today we discussed the following problems:    #mood dysregulation  #impulsivity  #procrastination  Symptoms above could be part of the clinical picture for ADD however in order to get diagnostic clarification more information related to the symptomatology and collateral information would be necessary. As he is interested in doing a neuropsychological testing for ASD it would be valuable to have neuropsychological testing done for ADD as well. It is worth noting that he expressed his lack of interest in stimulants. DBT could be beneficial in managing distress. Referral is sent.    #anxiety around strangers  #fear of negative judgment  #avoidance of social situations  Anxiety in the social context appears to have developed in teenage years. CBT would be " considered the gold standard treatment. Lexapro was started in June 2023 at 5 mg which has been tolerated well and partially effective per patient. It would be recommended to increase the dose to 20 mg over three weeks and monitor the symptoms.    #missing social cues  #sensitivity to smell and photosensitivity  Neuopsychological testing for ASD would be recommended.    #cannabis use may contribute to inattention, anxiety and social challenges.    Psychotropic Drug Interactions:  [PSYCHCLINICDDI]  none  Management: N/A    [place] was not checked today: not using controlled substances    Risk Statements:   Treatment Risk- Risks, benefits, alternatives and potential adverse effects have been discussed and are understood.   Safety Risk-Mark did not appear to be an imminent safety risk to self or others.     Plan     1) Medications:   - Decrease quetiapine from 150 mg to 100 mg.  - Increase lexapro from 5 mg to 10 mg at bedtime for 7 days THEN if tolerated increase to 15 mg at bedtime for 7 days THEN if tolerated increase to 20 mg at bedtime.    2) Psychotherapy: Referral for DBT was sent    3) Next due:  Labs- atypical neuroleptic monitoring labs partially complete (lipids and HgbA1c) 4/2023.  EKG- N/A   Rating scales- none needed    4) Referrals:   -neuropsychological testing,   -DBT    5) Other: none    6) Follow-up: Return to clinic in 4 weeks     Pertinent Background                                                   [most recent eval 08/21/23]     The following section contains information copied from previous note by Dr Jaimes on 5/8/22 and has been reviewed, edited, and verified by the patient.     Mark first experienced anxiety for most of his childhood, until age 19 did not know his father because parents . Grew up with with IEP for LD with aggression/fights at school, depressive episodes in adolescence with SI and SIB. No hospitalizations, has been inconsistent with medications and MH care until  recently.   Describes difficulty with social queues that result in frustration and anxiety that he identifies with mild autism - no psychometric testing.    Pertinent Items Include: suicide attempt , suicidal ideation, SIB , aggression and substance use: cannabi     Subjective     -states seroquel was started for dx of bipolar disorder, however denies symptoms of kayla/hpomania  -reports symptoms that he believe could be justified by ASD, such as overstimulation by smell and visual information, repetitive hand rubbing or sounds he make when excited, hx of head banging, misreading social cues  -denies depression, SI, SIB, HI, AVH  -reports anxiety when meeting a new person  -reports interrupting people, mood dysregulation, impulsivity, procrastination  -lexapro helped with decreasing social anxiety symptoms.  -sleeps well with seroquel        Current Social History:  Financial/occupational: food delivery  Living situation (partner, children, pets, etc): partner  Social/spiritual support: fiance  Feels safe at home: Yes    Pertinent Substance Use:   -alcohol: Yes: 3 nights a week  -cannabis: Yes: smokes cannabis several times daily    -tobacco: No  -caffeine:  Yes: 2 coffees daily   -opioids: No   Narcan Kit currrent: No   -other: none      Medical Review of Systems:   Lightheadedness/orthostasis: None  Headaches: None  GI: None  Sexual health concerns: None    A comprehensive review of systems was performed and is negative other than noted above.     Mental Status Exam     Alertness: alert  and oriented  Appearance: well groomed  Behavior/Demeanor: cooperative, pleasant, and calm, with good  eye contact   Speech: normal and regular rate and rhythm  Language: intact and no problems  Psychomotor: normal or unremarkable  Mood: description consistent with euthymia  Affect: full range and appropriate; congruent to: mood- yes, content- yes  Thought Process/Associations: unremarkable  Thought Content:  Reports none;  Denies  "suicidal & violent ideation and delusions  Perception:  Reports none;  Denies hallucinations  Insight: good  Judgment: good  Cognition: does  appear grossly intact; formal cognitive testing was not done  Gait and Station: N/A (telehealth)     Social History                                 pt reported     Living Situation - rents house, has two roommates, lives with ila in [place].    Finances- works as  for food.    Social/ Spiritual Support- childhood friend/roommate, nghia, his pets, his father.    Other - Had IEP for learning disability in school and had issues with fighting and getting teased, had counseling session with high school counselor in .   Legal - went to [employer] for terrorist threats to family and was on probation for 4 years     Family Mental Health History                                 pt reported     Father - anxiety, grandmother - cocaine, mother - AUD, uncle - OUD      Past Psychiatric History            SIB- yes, cuts arms   Suicide Attempt [#, most recent]- Yes: tried to \"collapse my veins\" but it didn't work    Suicidal Ideation Hx- Yes: gestures and preparations including standing on train tracks, wrapping cord around his neck    Violence/Aggression Hx- No   Psychosis Hx- No   Eating Disorder Hx- No   Other- none   Psych Hosp [#, most recent]- No   Commitment- No   TMS/ECT- No   Outpatient Programs - Yes: outpatient psychotherapy with Georgette Castillo    Other - none    SUBSTANCE USE HISTORY   Past Use:  -alcohol: Yes: period of time drinking every night (6-12 beers every night) 2 years ago when on probation and could not use cannabis   -cannabis: Yes: smokes cannabis 2-3 x daily    -tobacco: Yes: 10 years ago stopped   -caffeine:  Yes: up to 5 energy drinks in adolescence    -opioids: No   Narcan Kit current: No   -other: shrooms  Treatment: #, most recent- No   Medical Consequences: No   Legal Consequences: No      Past Psych Med Trials          Medication Max Dose " (mg) Dates / Duration Helpful? DC Reason / Adverse Effects?   Celexa  20      Inadequate trial     Seroquel  200      Helps w sleep    Buspar        Sexual side effects                     Vitals   There were no vitals taken for this visit.  Pulse Readings from Last 3 Encounters:   02/28/22 114   02/15/22 104   02/09/22 95     Wt Readings from Last 3 Encounters:   02/28/22 93.4 kg (206 lb)   02/09/22 81.6 kg (180 lb)   09/20/21 82.3 kg (181 lb 8 oz)     BP Readings from Last 3 Encounters:   02/28/22 136/80   02/15/22 (!) 142/88   02/09/22 (!) 142/93        Medical History     ALLERGIES: Aspartame    Patient Active Problem List   Diagnosis    Bipolar disorder (H)    Marijuana use, episodic    Tobacco use disorder        Medications     Current Outpatient Medications   Medication Sig Dispense Refill    desonide (DESOWEN) 0.05 % external cream APPLY TWICE DAILY TO LSC, 3 WEEKS ON, 1 WEEK OF. REPEAT AS NEEDED      [START ON 8/22/2023] escitalopram (LEXAPRO) 10 MG tablet Take 1 tablet (10 mg) by mouth daily for 7 days, THEN 1.5 tablets (15 mg) daily for 7 days, THEN 2 tablets (20 mg) daily for 7 days. 32 tablet 1    QUEtiapine (SEROQUEL) 100 MG tablet Take 1 tablet (100 mg) by mouth At Bedtime 30 tablet 1        Labs and Data         2/15/2022    10:00 AM 5/7/2023     1:31 PM 8/21/2023     2:28 PM   PROMIS-10 Total Score w/o Sub Scores   PROMIS TOTAL - SUBSCORES 17 23 40         5/7/2023     1:32 PM   CAGE-AID Total Score   Total Score 1   Total Score MyChart 1 (A total score of 2 or greater is considered clinically significant)         9/20/2021    10:20 AM 5/7/2023     9:28 AM 8/21/2023     2:22 PM   PHQ-9 SCORE   PHQ-9 Total Score MyChart  4 (Minimal depression) 0   PHQ-9 Total Score 13 4 0         9/20/2021    10:20 AM 5/7/2023     1:29 PM   JUDITH-7 SCORE   Total Score  16 (severe anxiety)   Total Score 21 16       Liver/Kidney Function, TSH Metabolic Blood counts   No lab results found.  No lab results found. Recent  Labs   Lab Test 04/03/23  1139   CHOL 224*   TRIG 76   *   HDL 40     Recent Labs   Lab Test 04/03/23  1139   A1C 5.2     No lab results found. No lab results found.        ECG 12/4/2008 QTc = 403 ms      PROVIDER: Elton Gentile MD    Level of Medical Decision Making:   - At least 1 chronic problem that is not stable  - Engaged in prescription drug management during visit (discussed any medication benefits, side effects, alternatives, etc.)           Psychiatry Individual Psychotherapy Note   Psychotherapy start time - 2:45 PM  Psychotherapy end time - 3:05 PM  Date last reviewed with patient - 08/21/23  Subjective: This supportive psychotherapy session addressed issues related to goals of therapy and current psychosocial stressors. Patient's reaction: Preparatory in the context of mental status appropriate for ambulatory setting.    Interactive complexity indicated? No  Plan: RTC in timeframe noted above  Psychotherapy services during this visit included myself and the patient.   Treatment Plan      SYMPTOMS; PROBLEMS   MEASURABLE GOALS;    FUNCTIONAL IMPROVEMENT / GAINS INTERVENTIONS DISCHARGE CRITERIA   Anxiety: social anxiety   reduce panic attacks/ excessive worry Supportive / psychodynamic marked symptom improvement       Patient not staffed in clinic.  Note will be reviewed and signed by supervisor Dr. Garcia.

## 2023-08-21 NOTE — NURSING NOTE
Is the patient currently in the state of [place]? YES    Visit mode:VIDEO    If the visit is dropped, the patient can be reconnected by: VIDEO VISIT: Text to cell phone:   Telephone Information:   Mobile [phone]       Will anyone else be joining the visit? NO  (If patient encounters technical issues they should call [phone]    How would you like to obtain your AVS? MyChart    Are changes needed to the allergy or medication list? No    Reason for visit: No chief complaint on file.    Jyothi VILLAGRANF

## 2023-09-15 ENCOUNTER — MYC REFILL (OUTPATIENT)
Dept: PSYCHIATRY | Facility: CLINIC | Age: 33
End: 2023-09-15
Payer: COMMERCIAL

## 2023-09-15 DIAGNOSIS — F41.9 ANXIETY DISORDER, UNSPECIFIED TYPE: ICD-10-CM

## 2023-09-15 DIAGNOSIS — F33.40 RECURRENT MAJOR DEPRESSIVE DISORDER, IN REMISSION (H): Primary | ICD-10-CM

## 2023-09-15 RX ORDER — ESCITALOPRAM OXALATE 10 MG/1
TABLET ORAL
Qty: 32 TABLET | Refills: 1 | Status: CANCELLED | OUTPATIENT
Start: 2023-09-15 | End: 2023-10-05

## 2023-09-19 RX ORDER — ESCITALOPRAM OXALATE 20 MG/1
20 TABLET ORAL DAILY
Qty: 30 TABLET | Refills: 0 | Status: SHIPPED | OUTPATIENT
Start: 2023-09-19 | End: 2024-01-18

## 2023-09-19 NOTE — TELEPHONE ENCOUNTER
Writer reached out to patient. Was able to talk to patient . Patient confirmed he is now taking Lexapro 20 mg daily .    Valarie Sun on 9/19/2023 at 10:52 AM

## 2023-09-25 ENCOUNTER — VIRTUAL VISIT (OUTPATIENT)
Dept: PSYCHIATRY | Facility: CLINIC | Age: 33
End: 2023-09-25
Attending: PSYCHIATRY & NEUROLOGY
Payer: COMMERCIAL

## 2023-09-25 DIAGNOSIS — F41.9 ANXIETY DISORDER, UNSPECIFIED TYPE: ICD-10-CM

## 2023-09-25 PROCEDURE — 99214 OFFICE O/P EST MOD 30 MIN: CPT | Mod: GC

## 2023-09-25 RX ORDER — ESCITALOPRAM OXALATE 10 MG/1
10 TABLET ORAL DAILY
Qty: 30 TABLET | Refills: 1 | Status: SHIPPED | OUTPATIENT
Start: 2023-09-25 | End: 2023-11-21

## 2023-09-25 RX ORDER — QUETIAPINE FUMARATE 100 MG/1
100 TABLET, FILM COATED ORAL AT BEDTIME
Qty: 30 TABLET | Refills: 1 | Status: SHIPPED | OUTPATIENT
Start: 2023-09-25 | End: 2023-11-21

## 2023-09-25 ASSESSMENT — PAIN SCALES - GENERAL: PAINLEVEL: NO PAIN (0)

## 2023-09-25 NOTE — PATIENT INSTRUCTIONS
**For crisis resources, please see the information at the end of this document**   Patient Education    Thank you for coming to the [facility] [place] [facility].     Lab Testing:  If you had lab testing today and your results are reassuring or normal they will be mailed to you or sent through [employer] within 7 days. If the lab tests need quick action we will call you with the results. The phone number we will call with results is # [phone]. If this is not the best number please call our clinic and change the number.     Medication Refills:  If you need any refills please call your pharmacy and they will contact us. Our fax number for refills is [phone].   Three business days of notice are needed for general medication refill requests.   Five business days of notice are needed for controlled substance refill requests.   If you need to change to a different pharmacy, please contact the new pharmacy directly. The new pharmacy will help you get your medications transferred.     Contact Us:  Please call [phone] during business hours (8-5:00 M-F).   If you have medication related questions after clinic hours, or on the weekend, please call [phone].     Financial Assistance [phone]   Medical Records [phone]       MENTAL HEALTH CRISIS RESOURCES:  For a emergency help, please call 911 or go to the nearest Emergency Department.     Emergency Walk-In Options:   EmPATH Unit @ [facility] ([place]): [phone] - Specialized mental health emergency area designed to be calming  [facility] ([place]): [phone]  [facility] Acute Psychiatry [facility] ([place]): [phone]  [facility]): [phone]    [facility] Crisis Information:   [place]: [phone]  [place]: [phone]  [place] ([place]) - Adult: [phone]     Child: [phone]  [place] - Adult: [phone]     Child: [phone]  [place]:  [phone]  List of all [facility] resources:   https://[place].gov/dhs/people-we-serve/adults/health-care/mental-health/resources/crisis-contacts.jsp    National Crisis Information:   Crisis Text Line: Text  [place]  to 977381  Suicide & Crisis Lifeline: 988  National Suicide Prevention Lifeline: 3-284-698-TALK (1-646.316.6184)       For online chat options, visit https://suicidepreventionlifeline.org/chat/  Poison Control Center: 9-185-559-5214  Trans Lifeline: 3-334-888-7351 - Hotline for transgender people of all ages  The [employer]: 0-455-509-6052 - Hotline for LGBT youth     For Non-Emergency Support:   Fast Tracker: Mental Health & Substance Use Disorder Resources -   https://www.[employer].org/

## 2023-09-25 NOTE — PROGRESS NOTES
Virtual Visit Details    Type of service:  Video Visit   Video Start Time:  3:46 PM  Video End Time:3:13 PM    Originating Location (pt. Location): Home    Distant Location (provider location):  On-site  Platform used for Video Visit: Braxton Flores is a 33 year old who uses the pronouns he, him, his.      Diagnoses   General anxiety disorder  Social anxiety disorder  R/o ADHD  Unspecified Mood Disorder (diagnostic history of bipolar disorder)  Cannabis use disorder, unspecified severity, active     Assessment     Mark is a 33 year old who over the past few years has started to focus on his mental health concerns. He has a history of seeing a psychiatrist on an involuntary basis as part of a mandatory program for a few years where he was diagnosed and treated for a diagnosis of Bipolar Disorder. He states the diagnosis never resonated with him. His concerns about mood dysregulation, impulsivity and anger management difficulties were perceived as part of Bipolar disorder. During that time he was curious to know if he had ADHD but was never screened or treated for it. Since 2023 he has learned more about Autism spectrum and this diagnosis resonates well with him.     Today we discussed the following problems:    #mood dysregulation  #impulsivity  #procrastination  Symptoms above could be part of the clinical picture for ADD however in order to get diagnostic clarification more information related to the symptomatology and collateral information would be necessary. As he is interested in doing a neuropsychological testing for ASD it would be valuable to have neuropsychological testing done for ADD as well. It is worth noting that he expressed his lack of interest in stimulants. DBT could be beneficial in managing distress. Referral is sent.    #anxiety around strangers  #fear of negative judgment  #avoidance of social situations  Anxiety in the social context appears to have developed in teenage years. CBT would  be considered the gold standard treatment. Lexapro was started in June 2023 at 5 mg which has been tolerated well and partially effective per patient. It would be recommended to increase the dose to 20 mg over three weeks and monitor the symptoms.    #missing social cues  #sensitivity to smell and photosensitivity  Neuopsychological testing for ASD would be recommended.    #cannabis use may contribute to inattention, anxiety and social challenges - smokes cannabis several times daily.     Mark was interested in lowering the dosages of his quetiapine and Lexapro. Sexual dysfunction/ejaculatory difficulties - thought to be medication side effects.     Psychotropic Drug Interactions:  [PSYCHCLINICDDI]  none  Management: N/A    MNPMP was not checked today: not using controlled substances    Risk Statements:   Treatment Risk- Risks, benefits, alternatives and potential adverse effects have been discussed and are understood.   Safety Risk-Mark did not appear to be an imminent safety risk to self or others.     Plan     1) Medications:   - Decrease quetiapine from 150 mg to 100 mg.  - Decrease Lexapro from 20 mg to 15 mg daily.    2) Psychotherapy: Continues individual therapy     3) Next due:  Labs- atypical neuroleptic monitoring labs partially complete (lipids and HgbA1c) 4/2023.  EKG- N/A   Rating scales- none needed    4) Referrals:   None  Discussed assessment for Autism Spectrum Disorder and ADHD - neuropsychological testing/psychological testing.    5) Other: none    6) Follow-up: Return to clinic in 4 weeks     Pertinent Background                                                   [most recent eval 08/21/23]     The following section contains information copied from previous note by Dr Jaimes on 5/8/22 and has been reviewed, edited, and verified by the patient.     Mark first experienced anxiety for most of his childhood, until age 19 did not know his father because parents . Grew up with with [facility]  for LD with aggression/fights at school, depressive episodes in adolescence with SI and SIB. No hospitalizations, has been inconsistent with medications and MH care until recently.   Describes difficulty with social queues that result in frustration and anxiety that he identifies with mild autism - no psychometric testing.    Pertinent Items Include: suicide attempt , suicidal ideation, SIB , aggression and substance use: cannabis     Subjective       -[employer] festival  -Tired   Bards outfit  -Things have been steady. No changes, Maybe little more control over anxiety. No anxiety attacks. Not frequent.  No issues with sleep with seroquel 50 mg.  Scheduled for that.  Mood has been the same. Gets upset and mad. Reaction based depression. More stable moods.  One  Sex drive and libido went down.  Issues with ejaculation  It is not as severe  Ejaculation  Does talk therapy every week  Therapist outside [place]      -states seroquel was started for dx of bipolar disorder, however denies symptoms of kayla/hpomania  -reports symptoms that he believe could be justified by ASD, such as overstimulation by smell and visual information, repetitive hand rubbing or sounds he make when excited, hx of head banging, misreading social cues  -denies depression, SI, SIB, HI, AVH  -reports anxiety when meeting a new person  -reports interrupting people, mood dysregulation, impulsivity, procrastination  -lexapro helped with decreasing social anxiety symptoms.  -sleeps well with seroquel        Current Social History:  Financial/occupational: food delivery  Living situation (partner, children, pets, etc): partner  Social/spiritual support: fiance  Feels safe at home: Yes    Pertinent Substance Use:   -alcohol: Yes: 3 nights a week  -cannabis: Yes: smokes cannabis several times daily    -tobacco: No  -caffeine:  Yes: 2 coffees daily   -opioids: No   Narcan Kit currrent: No   -other: none      Medical Review of Systems:  "  Lightheadedness/orthostasis: None  Headaches: None  GI: None  Sexual health concerns: None    A comprehensive review of systems was performed and is negative other than noted above.     Mental Status Exam     Alertness: alert  and oriented  Appearance: well groomed  Behavior/Demeanor: cooperative, pleasant, and calm, with good  eye contact   Speech: normal and regular rate and rhythm  Language: intact and no problems  Psychomotor: normal or unremarkable  Mood: description consistent with euthymia  Affect: full range and appropriate; congruent to: mood- yes, content- yes  Thought Process/Associations: unremarkable  Thought Content:  Reports none;  Denies suicidal & violent ideation and delusions  Perception:  Reports none;  Denies hallucinations  Insight: good  Judgment: good  Cognition: does  appear grossly intact; formal cognitive testing was not done  Gait and Station: N/A (telehealth)     Past Psychiatric History            SIB- yes, cuts arms   Suicide Attempt [#, most recent]- Yes: tried to \"collapse my veins\" but it didn't work    Suicidal Ideation Hx- Yes: gestures and preparations including standing on train tracks, wrapping cord around his neck    Violence/Aggression Hx- No   Psychosis Hx- No   Eating Disorder Hx- No   Other- none   Psych Hosp [#, most recent]- No   Commitment- No   TMS/ECT- No   Outpatient Programs - Yes: outpatient psychotherapy with Georgette Castillo    Other - none    SUBSTANCE USE HISTORY   Past Use:  -alcohol: Yes: period of time drinking every night (6-12 beers every night) 2 years ago when on probation and could not use cannabis   -cannabis: Yes: smokes cannabis 2-3 x daily    -tobacco: Yes: 10 years ago stopped   -caffeine:  Yes: up to 5 energy drinks in adolescence    -opioids: No   Narcan Kit current: No   -other: [facility]  Treatment: #, most recent- No   Medical Consequences: No   Legal Consequences: No      Vitals   There were no vitals taken for this visit.  Pulse Readings from " Last 3 Encounters:   02/28/22 114   02/15/22 104   02/09/22 95     Wt Readings from Last 3 Encounters:   02/28/22 93.4 kg (206 lb)   02/09/22 81.6 kg (180 lb)   09/20/21 82.3 kg (181 lb 8 oz)     BP Readings from Last 3 Encounters:   02/28/22 136/80   02/15/22 (!) 142/88   02/09/22 (!) 142/93      Medications     Current Outpatient Medications   Medication Sig Dispense Refill    desonide (DESOWEN) 0.05 % external cream [place] TWICE [place] [facility] [place], 1 WEEK OF. REPEAT AS NEEDED      escitalopram (LEXAPRO) 20 MG tablet Take 1 tablet (20 mg) by mouth daily 30 tablet 0    QUEtiapine (SEROQUEL) 100 MG tablet Take 1 tablet (100 mg) by mouth At Bedtime 30 tablet 1        Labs and Data         2/15/2022    10:00 AM 5/7/2023     1:31 PM 8/21/2023     2:28 PM   PROMIS-10 Total Score w/o Sub Scores   PROMIS TOTAL - SUBSCORES 17 23 40         5/7/2023     1:32 PM   CAGE-AID Total Score   Total Score 1   Total Score MyChart 1 (A total score of 2 or greater is considered clinically significant)         9/20/2021    10:20 AM 5/7/2023     9:28 AM 8/21/2023     2:22 PM   PHQ-9 SCORE   PHQ-9 Total Score MyChart  4 (Minimal depression) 0   PHQ-9 Total Score 13 4 0         9/20/2021    10:20 AM 5/7/2023     1:29 PM   JUDITH-7 SCORE   Total Score  16 (severe anxiety)   Total Score 21 16       Liver/Kidney Function, TSH Metabolic Blood counts   No lab results found.  No lab results found. Recent Labs   Lab Test 04/03/23  1139   CHOL 224*   TRIG 76   *   HDL 40     Recent Labs   Lab Test 04/03/23  1139   A1C 5.2     No lab results found. No lab results found.        ECG 12/4/2008 QTc = 403 ms      PROVIDER: Elton Gentile MD    Level of Medical Decision Making:   - At least 1 chronic problem that is not stable  - Engaged in prescription drug management during visit (discussed any medication benefits, side effects, alternatives, etc.)       Patient staffed in clinic with Dr. Garcia who will sign the note.  Supervisor is   Brian

## 2023-09-25 NOTE — NURSING NOTE
Is the patient currently in the state of [place]? YES    Visit mode:VIDEO    If the visit is dropped, the patient can be reconnected by: VIDEO VISIT: Text to cell phone:   Telephone Information:   Mobile [phone]       Will anyone else be joining the visit? NO  (If patient encounters technical issues they should call [phone]    How would you like to obtain your AVS? MyChart    Are changes needed to the allergy or medication list? Yes Changes in dosing.     Reason for visit: RECHECK  No time to complete qnrs.       Selina PINEDA

## 2023-10-16 DIAGNOSIS — F33.40 RECURRENT MAJOR DEPRESSIVE DISORDER, IN REMISSION (H): ICD-10-CM

## 2023-10-18 ENCOUNTER — MYC MEDICAL ADVICE (OUTPATIENT)
Dept: PSYCHIATRY | Facility: CLINIC | Age: 33
End: 2023-10-18
Payer: COMMERCIAL

## 2023-10-18 NOTE — TELEPHONE ENCOUNTER
Writer reached out to patient to check my chart to clarify dose.    Valarie Sun on 10/18/2023 at 9:17 AM

## 2023-10-18 NOTE — TELEPHONE ENCOUNTER
Medication requested: ESCITALOPRAM 20MG TABLETS   TAKE 1 TABLET(20 MG) BY MOUTH DAILY  Last refilled: 9-19-23  Qty: 30      Last seen: 9-25-23  RTC: 4 weeks  Cancel: 0  No-show: 0  Next appt: none    Last clinic note:- Decrease Lexapro from 20 mg to 15 mg daily.    Refill decision:   Refill pended and routed to the provider for review/determination due to   FYI to clinic requested /current Rx does not match clinic note

## 2023-10-19 RX ORDER — ESCITALOPRAM OXALATE 20 MG/1
20 TABLET ORAL DAILY
Qty: 30 TABLET | Refills: 0 | OUTPATIENT
Start: 2023-10-19

## 2023-10-19 NOTE — TELEPHONE ENCOUNTER
Dr. Lorenzo,    See refill request encounter. I sent a my chart to patient and he responded back and said he taking 10 mg Lexapro. Your notes said decrease 20 mg to 15 mg daily.    Please verify dose and send in new refills if different on current list. Patient should have one more refill on file if he is taking 10 mg , not 15 mg .    I have declined the refills for the Lexapro 20 mg .       Plan      1) Medications:   - Decrease quetiapine from 150 mg to 100 mg.  - Decrease Lexapro from 20 mg to 15 mg daily.        Valarie Sun on 10/19/2023 at 12:24 PM

## 2023-11-20 ENCOUNTER — VIRTUAL VISIT (OUTPATIENT)
Dept: PSYCHIATRY | Facility: CLINIC | Age: 33
End: 2023-11-20
Attending: PSYCHIATRY & NEUROLOGY
Payer: COMMERCIAL

## 2023-11-20 DIAGNOSIS — F40.10 SOCIAL ANXIETY DISORDER: Primary | ICD-10-CM

## 2023-11-20 DIAGNOSIS — R45.89 EMOTIONAL DYSREGULATION: ICD-10-CM

## 2023-11-20 PROCEDURE — 99214 OFFICE O/P EST MOD 30 MIN: CPT | Mod: VID

## 2023-11-20 NOTE — NURSING NOTE
Is the patient currently in the state of [place]? YES    Visit mode:VIDEO    If the visit is dropped, the patient can be reconnected by: VIDEO VISIT: Text to cell phone:   Telephone Information:   Mobile [phone]       Will anyone else be joining the visit? NO  (If patient encounters technical issues they should call [phone]    How would you like to obtain your AVS? MyChart    Are changes needed to the allergy or medication list? No    Reason for visit: No chief complaint on file.    Jyothi VILLAGRANF

## 2023-11-20 NOTE — PROGRESS NOTES
Virtual Visit Details    Type of service:  Video Visit   Video Start Time:  3:10 PM  Video End Time:4:02 PM    Originating Location (pt. Location): Home    Distant Location (provider location):  On-site  Platform used for Video Visit: Braxton Flores is a 33 year old who uses the pronouns he, him, his.      Diagnoses     Social anxiety disorder  Borderline personality traits  Unspecified Mood Disorder (diagnostic history of bipolar disorder)  Cannabis use disorder, unspecified severity, active     Assessment     Mark is a 33 year old who over the past few years has started to focus on his mental health concerns.    Today we discussed the following problems:    #mood dysregulation  #impulsivity  #fear of abandonment  He has a history of seeing a psychiatrist on an involuntary basis as part of a mandatory program for a few years where he was diagnosed and treated for a diagnosis of Bipolar Disorder. He states the diagnosis never resonated with him. His concerns about mood dysregulation, impulsivity and anger management difficulties were perceived as part of Bipolar disorder.    Symptoms above could be part of the clinical picture for BPD. MSI-BPD was filled out today and Mark scored 7/10 which is suggestive of cluster b traits. He works closely with his therapist and is recommended to discuss this with her and get her opinions as well. DBT could be beneficial in managing distress, which we will discuss in the next appointments.    #anxiety around strangers  #fear of negative judgment  #avoidance of social situations  Anxiety in the social context appears to have developed in teenage years. CBT would be considered the gold standard treatment. Lexapro was started in June 2023 at 5 mg which has been tolerated well and partially effective per patient. On the last visit Lexapro was increased to 20 mg. He developed sexual side effects which warranted dose decrease to 15 mg. He has been taking Lexapro 10 mg since  last visit. He did not find the medication to be helpful in controlling symptoms since starting it 6 months ago. We recommend switching to Sertraline and monitor symptoms over the next few months.    #missing social cues  #sensitivity to smell and photosensitivity  Since 2023 he has learned more about Autism spectrum and this diagnosis resonates well with him. Mark is interested in getting assessed for ASD. Neuropsychological testing for ASD could be informative.    #cannabis use may contribute to inattention, anxiety and social challenges - smokes cannabis several times daily.     Psychotropic Drug Interactions:  [PSYCHCLINICDDI]  none  Management: N/A    [place]NPMP was not checked today: not using controlled substances    Risk Statements:   Treatment Risk- Risks, benefits, alternatives and potential adverse effects have been discussed and are understood.   Safety Risk-Mark did not appear to be an imminent safety risk to self or others.     Plan     1) Medications:   - Cpntinue quetiapine 100 mg at bedtime.  - Decrease Lexapro from 10 mg to 5 mg daily.  - Start sertraline 25 mg daily and titrate up to 100 mg within 4 weeks.      2) Psychotherapy: Continues individual therapy [facility]    3) Next due:  Labs- atypical neuroleptic monitoring labs partially complete (lipids and HgbA1c) 4/2023.  EKG- N/A   Rating scales- none needed    4) Referrals:   None    5) Other: none    6) Follow-up: Return to clinic in 4 weeks     Pertinent Background                                                   [most recent eval 08/21/23]     The following section contains information copied from previous note by Dr Jaimes on 5/8/22 and has been reviewed, edited, and verified by the patient.     Mark first experienced anxiety for most of his childhood, until age 19 did not know his father because parents . Grew up with with IEP for LD with aggression/fights at school, depressive episodes in adolescence with SI and SIB. No  "hospitalizations, has been inconsistent with medications and MH care until recently.   Describes difficulty with social queues that result in frustration and anxiety that he identifies with mild autism - no psychometric testing.    Pertinent Items Include: suicide attempt , suicidal ideation, SIB , aggression and substance use: cannabis     Subjective     -Not noticed any difference in mood with change in doses for escitalopram.  -Reports low mood, denies SI/SIB/HI/AVH. No safety concerns  -Reports that talking to people with position of authority, meeting new people, overwhelming. Always been like that. He gets embarrassed easily in social settings. Endorses fears of being judged.  -Reports being abandoned by mom growing up, and by girlfriend recently. Always cautious and \"looking for signs\" about experiencing abandonment again.  -Is in psychotherapy for 4 months.      MSI-BPD questionnaire  1-  Yes  2-  Yes  3-  Yes binge drinking, cannabis daily  4-  Yes in response to something  5-  Yes short temper  6-  Yes  7-  No don't belong  8-  No moments not always  9-  No  10- Yes   ---------------------  Total score: 7/10      Current Social History:  Financial/occupational: food delivery  Living situation (partner, children, pets, etc): partner  Social/spiritual support: fiance  Feels safe at home: Yes    Pertinent Substance Use:   -alcohol: Yes: 3 nights a week  -cannabis: Yes: smokes cannabis several times daily    -tobacco: No  -caffeine:  Yes: 2 coffees daily   -opioids: No   Narcan Kit currrent: No   -other: none      Medical Review of Systems:   Lightheadedness/orthostasis: None  Headaches: None  GI: None     Mental Status Exam     Alertness: alert  and oriented  Appearance: well groomed  Behavior/Demeanor: cooperative, pleasant, and calm, with good  eye contact   Speech: normal and regular rate and rhythm  Language: intact and no problems  Psychomotor: normal or unremarkable  Mood: description consistent with " euthymia  Affect: full range and appropriate; congruent to: mood- yes, content- yes  Thought Process/Associations: unremarkable  Thought Content:  Reports none;  Denies suicidal & violent ideation and delusions  Perception:  Reports none;  Denies hallucinations  Insight: good  Judgment: good  Cognition: does  appear grossly intact; formal cognitive testing was not done  Gait and Station: N/A (telehealth)     Psych Summary Points   11/2023 - Started cross titration from Lexapro to sertraline.      Vitals   There were no vitals taken for this visit.  Pulse Readings from Last 3 Encounters:   02/28/22 114   02/15/22 104   02/09/22 95     Wt Readings from Last 3 Encounters:   02/28/22 93.4 kg (206 lb)   02/09/22 81.6 kg (180 lb)   09/20/21 82.3 kg (181 lb 8 oz)     BP Readings from Last 3 Encounters:   02/28/22 136/80   02/15/22 (!) 142/88   02/09/22 (!) 142/93         PAST MED TRIALS       Medication Max Dose (mg) Dates / Duration Helpful? DC Reason / Adverse Effects?   Celexa  20      Inadequate trial     Seroquel  200      Helps w sleep    Buspar        Sexual side effects                     Medications     Current Outpatient Medications   Medication Sig Dispense Refill    desonide (DESOWEN) 0.05 % external cream APPLY TWICE DAILY TO [facility], 3 WEEKS ON, 1 WEEK OF. REPEAT AS NEEDED      escitalopram (LEXAPRO) 10 MG tablet Take 0.5 tablets (5 mg) by mouth daily 7 tablet 0    escitalopram (LEXAPRO) 20 MG tablet Take 1 tablet (20 mg) by mouth daily 30 tablet 0    QUEtiapine (SEROQUEL) 100 MG tablet Take 1 tablet (100 mg) by mouth at bedtime 30 tablet 1    sertraline (ZOLOFT) 50 MG tablet Take 0.5 tablets (25 mg) by mouth daily for 7 days, THEN 1 tablet (50 mg) daily for 7 days, THEN 1.5 tablets (75 mg) daily for 7 days, THEN 2 tablets (100 mg) daily for 7 days. 35 tablet 1      Labs and Data         2/15/2022    10:00 AM 5/7/2023     1:31 PM 8/21/2023     2:28 PM   PROMIS-10 Total Score w/o Sub Scores   PROMIS TOTAL -  SUBSCORES 17 23 40         5/7/2023     1:32 PM   CAGE-AID Total Score   Total Score 1   Total Score MyChart 1 (A total score of 2 or greater is considered clinically significant)         9/20/2021    10:20 AM 5/7/2023     9:28 AM 8/21/2023     2:22 PM   PHQ-9 SCORE   PHQ-9 Total Score MyChart  4 (Minimal depression) 0   PHQ-9 Total Score 13 4 0         9/20/2021    10:20 AM 5/7/2023     1:29 PM   JUDITH-7 SCORE   Total Score  16 (severe anxiety)   Total Score 21 16       Liver/Kidney Function, TSH Metabolic Blood counts   No lab results found.  No lab results found. Recent Labs   Lab Test 04/03/23  1139   CHOL 224*   TRIG 76   *   HDL 40     Recent Labs   Lab Test 04/03/23  1139   A1C 5.2     No lab results found. No lab results found.        ECG 12/4/2008 QTc = 403 ms      PROVIDER: Elton Gentile MD    Level of Medical Decision Making:   - At least 1 chronic problem that is not stable  - Engaged in prescription drug management during visit (discussed any medication benefits, side effects, alternatives, etc.)       Patient staffed in clinic with Dr. Chau who will sign the note.  Supervisor is Dr. Garcia.

## 2023-11-20 NOTE — PROGRESS NOTES
Virtual Visit Details    Type of service:  Video Visit     Originating Location (pt. Location): Home    Distant Location (provider location):  On-site  Platform used for Video Visit: Braxton

## 2023-11-21 RX ORDER — QUETIAPINE FUMARATE 100 MG/1
100 TABLET, FILM COATED ORAL AT BEDTIME
Qty: 30 TABLET | Refills: 1 | Status: SHIPPED | OUTPATIENT
Start: 2023-11-21 | End: 2024-01-18

## 2023-11-21 RX ORDER — ESCITALOPRAM OXALATE 10 MG/1
5 TABLET ORAL DAILY
Qty: 7 TABLET | Refills: 0 | Status: SHIPPED | OUTPATIENT
Start: 2023-11-21 | End: 2023-12-12

## 2023-11-22 NOTE — PATIENT INSTRUCTIONS
Thank you for coming to the [facility] [place] [facility].     On the November 20th visit we decided to switch your anti-anxiety medication from escitalopram to sertraline. Please follow the instruction below to cross-taper:           Escitalopram     Sertraline  Week 1:   Take half a 10 mg tablet (5 mg) every day Take half a 50 mg tablet every day  Week 2:   Take half a 10 mg tablet (5 mg) every day Take a full 50 mg tablet every day  Week 3:    Stop       Take 1.5 of 50 mg tablet every day  Week 4:    Stop      Take two 50 mg tablets every day    Continue taking Quetiapine 100 mg at bedtime.    Looking forward to seeing you in 4 weeks.    **For crisis resources, please see the information at the end of this document**   Patient Education        Lab Testing:  If you had lab testing today and your results are reassuring or normal they will be mailed to you or sent through [employer] within 7 days. If the lab tests need quick action we will call you with the results. The phone number we will call with results is # [phone]. If this is not the best number please call our clinic and change the number.     Medication Refills:  If you need any refills please call your pharmacy and they will contact us. Our fax number for refills is [phone].   Three business days of notice are needed for general medication refill requests.   Five business days of notice are needed for controlled substance refill requests.   If you need to change to a different pharmacy, please contact the new pharmacy directly. The new pharmacy will help you get your medications transferred.     Contact Us:  Please call [phone] during business hours (8-5:00 M-F).   If you have medication related questions after clinic hours, or on the weekend, please call [phone].     Financial Assistance [phone]   Medical Records [phone]       MENTAL HEALTH CRISIS RESOURCES:  For a emergency help, please call [phone]  or go to the nearest Emergency Department.     Emergency Walk-In Options:   EmPATH Unit @ [facility] ([place]): [phone] - Specialized mental health emergency area designed to be calming  [facility] ([place]): [phone]  [facility] ([place]): [phone]  [facility] ([place]): [phone]    County Crisis Information:   [place]: [phone]  [place]: [phone]  [place] ([place]) - Adult: [phone]     Child: [phone]  [place] - Adult: [phone]     Child: [phone]  [place]: [phone]  List of all [facility] resources:   https://[place].[facility]/dhs/people-we-serve/adults/health-care/mental-health/resources/crisis-contacts.jsp    National Crisis Information:   Crisis Text Line: Text  [place]  to 159561  Suicide & Crisis Lifeline: 988  National Suicide Prevention Lifeline: 2-975-704-TALK (1-678.354.4706)       For online chat options, visit https://suicidepreventionlifeline.org/chat/  Poison Control Center: 1-735.392.1713  Trans Lifeline: 1-316.114.7797 - Hotline for transgender people of all ages  The [employer]: 1-582-642-7499 - Hotline for LGBT youth     For Non-Emergency Support:   Fast Tracker: Mental Health & Substance Use Disorder Resources -   https://www.[employer].org/

## 2023-12-09 DIAGNOSIS — F41.9 ANXIETY DISORDER, UNSPECIFIED TYPE: Primary | ICD-10-CM

## 2023-12-11 NOTE — TELEPHONE ENCOUNTER
Last Seen 11/20  RTC 4 weeks  Cancel None  No-Show None    Next Appt 12/18    Incoming Refill From [facility] via interface/fax    Medication Requested   escitalopram (LEXAPRO) 10 MG tablet     Directions   Take 0.5 tablets (5 mg) by mouth daily     Qty 7    Last Refill 11/21      Medication pended and routed to provider for approval.

## 2023-12-12 RX ORDER — ESCITALOPRAM OXALATE 10 MG/1
5 TABLET ORAL DAILY
Qty: 7 TABLET | Refills: 0 | Status: SHIPPED | OUTPATIENT
Start: 2023-12-12 | End: 2024-01-18

## 2023-12-18 ENCOUNTER — VIRTUAL VISIT (OUTPATIENT)
Dept: PSYCHIATRY | Facility: CLINIC | Age: 33
End: 2023-12-18
Attending: PSYCHIATRY & NEUROLOGY
Payer: COMMERCIAL

## 2023-12-18 DIAGNOSIS — F40.10 SOCIAL ANXIETY DISORDER: ICD-10-CM

## 2023-12-18 DIAGNOSIS — F41.9 ANXIETY DISORDER, UNSPECIFIED TYPE: ICD-10-CM

## 2023-12-18 DIAGNOSIS — Z79.899 ENCOUNTER FOR LONG-TERM (CURRENT) USE OF MEDICATIONS: Primary | ICD-10-CM

## 2023-12-18 DIAGNOSIS — R45.89 EMOTIONAL DYSREGULATION: ICD-10-CM

## 2023-12-18 PROCEDURE — 99214 OFFICE O/P EST MOD 30 MIN: CPT | Mod: VID

## 2023-12-18 RX ORDER — QUETIAPINE FUMARATE 50 MG/1
75 TABLET, FILM COATED ORAL AT BEDTIME
Qty: 45 TABLET | Refills: 1 | Status: SHIPPED | OUTPATIENT
Start: 2023-12-18 | End: 2024-01-18

## 2023-12-18 ASSESSMENT — PATIENT HEALTH QUESTIONNAIRE - PHQ9
10. IF YOU CHECKED OFF ANY PROBLEMS, HOW DIFFICULT HAVE THESE PROBLEMS MADE IT FOR YOU TO DO YOUR WORK, TAKE CARE OF THINGS AT HOME, OR GET ALONG WITH OTHER PEOPLE: NOT DIFFICULT AT ALL
SUM OF ALL RESPONSES TO PHQ QUESTIONS 1-9: 6
SUM OF ALL RESPONSES TO PHQ QUESTIONS 1-9: 6

## 2023-12-18 ASSESSMENT — PAIN SCALES - GENERAL: PAINLEVEL: NO PAIN (0)

## 2023-12-18 NOTE — PATIENT INSTRUCTIONS
**For crisis resources, please see the information at the end of this document**   Patient Education    Thank you for coming to the [facility] [place] [facility].     Lab Testing:  If you had lab testing today and your results are reassuring or normal they will be mailed to you or sent through [employer] within 7 days. If the lab tests need quick action we will call you with the results. The phone number we will call with results is # [phone]. If this is not the best number please call our clinic and change the number.     Medication Refills:  If you need any refills please call your pharmacy and they will contact us. Our fax number for refills is [phone].   Three business days of notice are needed for general medication refill requests.   Five business days of notice are needed for controlled substance refill requests.   If you need to change to a different pharmacy, please contact the new pharmacy directly. The new pharmacy will help you get your medications transferred.     Contact Us:  Please call [phone] during business hours (8-5:00 M-F).   If you have medication related questions after clinic hours, or on the weekend, please call [phone].     Financial Assistance [phone]   Medical Records [phone]       MENTAL HEALTH CRISIS RESOURCES:  For a emergency help, please call 911 or go to the nearest Emergency Department.     Emergency Walk-In Options:   EmPATH Unit @ [facility] ([place]): [phone] - Specialized mental health emergency area designed to be calming  [facility] ([place]): [phone]  [facility] ([place]): [phone]  [facility]): [phone]    [facility] Crisis Information:   [place]: [phone]  [place]: [phone]  [place] ([place]) - Adult: [phone]     Child: [phone]  [place] - Adult: [phone]     Child: [phone]  [place]:  [phone]  List of all [facility] resources:   https://[place].gov/dhs/people-we-serve/adults/health-care/mental-health/resources/crisis-contacts.jsp    National Crisis Information:   Crisis Text Line: Text  [place]  to 690536  Suicide & Crisis Lifeline: 988  National Suicide Prevention Lifeline: 2-775-575-TALK (1-111.668.7422)       For online chat options, visit https://suicidepreventionlifeline.org/chat/  Poison Control Center: 3-608-478-5924  Trans Lifeline: 4-548-715-8292 - Hotline for transgender people of all ages  [employer]: 1-017-463-0751 - Hotline for LGBT youth     For Non-Emergency Support:   Fast Tracker: Mental Health & Substance Use Disorder Resources -   https://www.[employer].org/

## 2023-12-18 NOTE — PROGRESS NOTES
Virtual Visit Details    Type of service:  Video Visit   Video Start Time:  2:36 PM  Video End Time:3:09 PM    Originating Location (pt. Location): Home    Distant Location (provider location):  On-site  Platform used for Video Visit: Braxton Flores is a 33 year old who uses the pronouns he, him, his.      Diagnoses     Social anxiety disorder  Borderline personality traits  Unspecified Mood Disorder (diagnostic history of bipolar disorder)  Cannabis use disorder, unspecified severity, active     Assessment     Mark is a 33 year old who over the past few years has started to focus on his mental health concerns.    Today we discussed the following problems:    #mood dysregulation  #impulsivity  #fear of abandonment  He has a history of seeing a psychiatrist on an involuntary basis as part of a mandatory program for a few years where he was diagnosed and treated for a diagnosis of Bipolar Disorder. He states the diagnosis never resonated with him. His concerns about mood dysregulation, impulsivity and anger management difficulties were perceived as part of Bipolar disorder.    MSI-BPD was filled out today and Mark scored 7/10 which is suggestive of cluster b traits. He works closely with his therapist and is recommended to discuss this with her and get her opinions as well. DBT could be beneficial in managing distress, which we will discuss in the coming appointments.    Mark endorses feeling more sedated in the morning and finds it difficult to wake up. We agreed to decrease quetiapine to 75 mg and reassess on the next visit.    #anxiety around strangers  #fear of negative judgment  #avoidance of social situations  Anxiety in the social context appears to have developed in teenage years. CBT would be considered the gold standard treatment. Lexapro started in June 2023 was minimally effective at low dose and no improvement in the symptoms when tapered to 20 mg. He could not tolerate the 20 mg for longer  than a few weeks due to sexual side effects.    On the last visit Lexapro cross taper to Sertraline started. He has been off of Lexapro and at 75 mg of Sertraline for the past 5 days. He has got very positive results from the sertraline, mainly decreasing anxiety and improving emotional lability. He denies any side effects including dizziness, nausea/vomiting, diarrhea or fatigue. We recommend keeping the dose at 75 mg until next visit and reassess.    #cannabis use may contribute to inattention, anxiety and social challenges - smokes cannabis several times daily.     Psychotropic Drug Interactions:  [PSYCHCLINICDDI]  none  Management: N/A    [place] was not checked today: not using controlled substances    Risk Statements:   Treatment Risk- Risks, benefits, alternatives and potential adverse effects have been discussed and are understood.   Safety Risk-Mark did not appear to be an imminent safety risk to self or others.     Plan     1) Medications:   - decrease quetiapine from 100 mg to 75 mg at bedtime.  - Continue sertraline 75 mg at bedtime      2) Psychotherapy: Continues individual therapy with Hannah    3) Next due:  Labs- atypical neuroleptic monitoring labs partially complete (lipids and HgbA1c) 4/2023. CBC, CMP, TSH is due.  EKG- due next in-person visit.  Rating scales- none needed    4) Referrals:   None    5) Other: none    6) Follow-up: Return to clinic in 4 weeks     Pertinent Background                                                   [most recent eval 08/21/23]     The following section contains information copied from previous note by Dr Jaimes on 5/8/22 and has been reviewed, edited, and verified by the patient.     Mark first experienced anxiety for most of his childhood, until age 19 did not know his father because parents . Grew up with with IEP for LD with aggression/fights at school, depressive episodes in adolescence with SI and SIB. No hospitalizations, has been inconsistent  with medications and  care until recently.   Describes difficulty with social queues that result in frustration and anxiety that he identifies with mild autism - no psychometric testing.    Pertinent Items Include: suicide attempt , suicidal ideation, SIB , aggression and substance use: cannabis     Subjective   -Reports great mood, denies SI/SIB/HI/AVH. No safety concerns  -Has been tolerating the medication  -Taking sertraline 75 mg for 5 days. And has been off of lexapro for 5 days.  -Worked better than expected.  -Has not experienced symptoms of anxiety  -More calm not think about something. Mind can be turned off and not to think about things.  -Had one episode of vivid dream about suicide when increased dose of sertraline from 50 mg to 75 mg. Denies any conscious thoughts of suicide. No safety concerns.  Have not found anything to help with nightmares. Built up a tolerance to melatonin.  Nightmares 2 per week average.  No SI consciousness.  -Goal today is to lower the Seroquel    Current Social History:  Financial/occupational: food delivery  Living situation (partner, children, pets, etc): partner  Social/spiritual support: fiance  Feels safe at home: Yes    Pertinent Substance Use:   -alcohol: Yes: 3 nights a week  -cannabis: Yes: smokes cannabis several times daily    -tobacco: No  -caffeine:  Yes: 2 coffees daily   -opioids: No   Narcan Kit currrent: No   -other: none      Medical Review of Systems:   Lightheadedness/orthostasis: None  Headaches: None  GI: None  Sexual health concerns: None    A comprehensive review of systems was performed and is negative other than noted above.     Mental Status Exam     Alertness: alert  and oriented  Appearance: well groomed  Behavior/Demeanor: cooperative, pleasant, and calm, with good  eye contact   Speech: normal and regular rate and rhythm  Language: intact and no problems  Psychomotor: normal or unremarkable  Mood: description consistent with euthymia  Affect:  "full range and appropriate;Reactive, congruent to: mood- yes, content- yes  Thought Process/Associations: unremarkable  Thought Content:  Reports none;  Denies suicidal & violent ideation and delusions  Perception:  Reports none;  Denies hallucinations  Insight: good  Judgment: good  Cognition: does  appear grossly intact; formal cognitive testing was not done  Gait and Station: N/A (telehealth)     Past Psychiatric History            SIB- yes, cuts arms   Suicide Attempt [#, most recent]- Yes: tried to \"collapse my veins\" but it didn't work    Suicidal Ideation Hx- Yes: gestures and preparations including standing on train tracks, wrapping cord around his neck    Violence/Aggression Hx- No   Psychosis Hx- No   Eating Disorder Hx- No   Other- none   Psych Hosp [#, most recent]- No   Commitment- No   TMS/ECT- No   Outpatient Programs - Yes: outpatient psychotherapy with Georgette Castillo    Other - none    SUBSTANCE USE HISTORY   Past Use:  -alcohol: Yes: period of time drinking every night (6-12 beers every night) 2 years ago when on probation and could not use cannabis   -cannabis: Yes: smokes cannabis 2-3 x daily    -tobacco: Yes: 10 years ago stopped   -caffeine:  Yes: up to 5 energy drinks in adolescence    -opioids: No   Narcan Kit current: No   -other: shrooms  Treatment: #, most recent- No   Medical Consequences: No   Legal Consequences: No      Vitals   There were no vitals taken for this visit.  Pulse Readings from Last 3 Encounters:   02/28/22 114   02/15/22 104   02/09/22 95     Wt Readings from Last 3 Encounters:   02/28/22 93.4 kg (206 lb)   02/09/22 81.6 kg (180 lb)   09/20/21 82.3 kg (181 lb 8 oz)     BP Readings from Last 3 Encounters:   02/28/22 136/80   02/15/22 (!) 142/88   02/09/22 (!) 142/93         PAST MED TRIALS       Medication Max Dose (mg) Dates / Duration Helpful? DC Reason / Adverse Effects?   Celexa  20      Inadequate trial     Seroquel  200      Helps w sleep    Buspar        Sexual " side effects    Lexapro  20 2023 - 6 months No   Not effective        Medications     Current Outpatient Medications   Medication Sig Dispense Refill    desonide (DESOWEN) 0.05 % external cream APPLY TWICE DAILY TO LSC, 3 WEEKS ON, 1 WEEK OF. REPEAT AS NEEDED      escitalopram (LEXAPRO) 10 MG tablet TAKE 1/2 TABLET(5 MG) BY MOUTH DAILY 7 tablet 0    escitalopram (LEXAPRO) 20 MG tablet Take 1 tablet (20 mg) by mouth daily 30 tablet 0    QUEtiapine (SEROQUEL) 100 MG tablet Take 1 tablet (100 mg) by mouth at bedtime 30 tablet 1    sertraline (ZOLOFT) 50 MG tablet Take 0.5 tablets (25 mg) by mouth daily for 7 days, THEN 1 tablet (50 mg) daily for 7 days, THEN 1.5 tablets (75 mg) daily for 7 days, THEN 2 tablets (100 mg) daily for 7 days. 35 tablet 1          Labs and Data         2/15/2022    10:00 AM 5/7/2023     1:31 PM 8/21/2023     2:28 PM   PROMIS-10 Total Score w/o Sub Scores   PROMIS TOTAL - SUBSCORES 17 23 40         5/7/2023     1:32 PM   CAGE-AID Total Score   Total Score 1   Total Score MyChart 1 (A total score of 2 or greater is considered clinically significant)         9/20/2021    10:20 AM 5/7/2023     9:28 AM 8/21/2023     2:22 PM   PHQ-9 SCORE   PHQ-9 Total Score MyChart  4 (Minimal depression) 0   PHQ-9 Total Score 13 4 0         9/20/2021    10:20 AM 5/7/2023     1:29 PM   JUDITH-7 SCORE   Total Score  16 (severe anxiety)   Total Score 21 16       Liver/Kidney Function, TSH Metabolic Blood counts   No lab results found.  No lab results found. Recent Labs   Lab Test 04/03/23  1139   CHOL 224*   TRIG 76   *   HDL 40     Recent Labs   Lab Test 04/03/23  1139   A1C 5.2     No lab results found. No lab results found.        No new ECG on file      PROVIDER: Elton Gentile MD    Level of Medical Decision Making:   - At least 1 chronic problem that is not stable  - Engaged in prescription drug management during visit (discussed any medication benefits, side effects, alternatives, etc.)       Patient  staffed in clinic with Dr. Garcia who will sign the note.  Supervisor is Dr. Garcia.

## 2023-12-18 NOTE — NURSING NOTE
Is the patient currently in the state of [place]? YES    Visit mode:VIDEO    If the visit is dropped, the patient can be reconnected by: VIDEO VISIT: Text to cell phone:   Telephone Information:   Mobile [phone]       Will anyone else be joining the visit? NO  (If patient encounters technical issues they should call [phone]    How would you like to obtain your AVS? MyChart    Are changes needed to the allergy or medication list? Yes Pt thinks he's no longer taking Lexapro. Please remove both dosages if that is correct.    Reason for visit: BALDO VILLAGRANF

## 2024-01-18 ENCOUNTER — VIRTUAL VISIT (OUTPATIENT)
Dept: PSYCHIATRY | Facility: CLINIC | Age: 34
End: 2024-01-18
Attending: PSYCHIATRY & NEUROLOGY
Payer: COMMERCIAL

## 2024-01-18 VITALS — WEIGHT: 205 LBS | HEIGHT: 71 IN | BODY MASS INDEX: 28.7 KG/M2

## 2024-01-18 DIAGNOSIS — F40.10 SOCIAL ANXIETY DISORDER: ICD-10-CM

## 2024-01-18 DIAGNOSIS — F41.9 ANXIETY DISORDER, UNSPECIFIED TYPE: Primary | ICD-10-CM

## 2024-01-18 PROCEDURE — 99214 OFFICE O/P EST MOD 30 MIN: CPT | Mod: 95

## 2024-01-18 PROCEDURE — 90833 PSYTX W PT W E/M 30 MIN: CPT | Mod: 95

## 2024-01-18 RX ORDER — QUETIAPINE FUMARATE 25 MG/1
25 TABLET, FILM COATED ORAL AT BEDTIME
Qty: 15 TABLET | Refills: 0 | Status: SHIPPED | OUTPATIENT
Start: 2024-01-18 | End: 2024-03-12

## 2024-01-18 ASSESSMENT — PATIENT HEALTH QUESTIONNAIRE - PHQ9
10. IF YOU CHECKED OFF ANY PROBLEMS, HOW DIFFICULT HAVE THESE PROBLEMS MADE IT FOR YOU TO DO YOUR WORK, TAKE CARE OF THINGS AT HOME, OR GET ALONG WITH OTHER PEOPLE: NOT DIFFICULT AT ALL
SUM OF ALL RESPONSES TO PHQ QUESTIONS 1-9: 4
SUM OF ALL RESPONSES TO PHQ QUESTIONS 1-9: 4

## 2024-01-18 NOTE — PROGRESS NOTES
Virtual Visit Details    Type of service:  Video Visit   Video Start Time:  3:31 PM  Video End Time: 4:25 PM    Originating Location (pt. Location): Home    Distant Location (provider location):  On-site  Platform used for Video Visit: Braxton Flores is a 33 year old who uses the pronouns he, him, his.      Diagnoses     Social anxiety disorder  Cannabis use disorder, unspecified severity, active  Cluster B traits  R/o ADHD     Assessment     Mark is a 33 year old who over the past few years has started to focus on his mental health concerns.    Today we discussed the following problems:    #mood dysregulation  #impulsivity  #fear of abandonment  He has a history of seeing a psychiatrist on an involuntary basis as part of a mandatory program for a few years where he was diagnosed and treated for a diagnosis of Bipolar Disorder. He states the diagnosis never resonated with him. His concerns about mood dysregulation, impulsivity and anger management difficulties were perceived as part of Bipolar disorder.    MSI-BPD was filled out today and Mark scored 7/10 which is suggestive of cluster b traits. He works closely with his therapist and is recommended to discuss this with her and get her opinions as well. DBT could be beneficial in managing distress, which we will discuss in the coming appointments.    Mark endorses feeling more sedated in the morning and finds it difficult to wake up. He is currently at 50 mg of Quetiapine. We agreed to taper him off within 2 weeks.    #inattention  #impulsivity  #emotional reactivity  Overlap with symptoms of BPD. He has a hx of learning disability as a kid. He was never formally assessed by a clinician for ADHD. It is likely that the learning disability and inconsistent poor performance in some subjects be due to undiagnosed ADHD. He is scheduled for an assessment with ADHD clinic in Summer 2024. He is not interested in treatment at this point. Will continue following  up.    #anxiety around strangers  #fear of negative judgment  #avoidance of social situations  Anxiety in the social context appears to have developed in teenage years. CBT would be considered the gold standard treatment. Lexapro started in June 2023 was minimally effective at low dose and no improvement in the symptoms when tapered to 20 mg. He could not tolerate the 20 mg for longer than a few weeks due to sexual side effects.      He has been off of Lexapro and at 75 mg of Sertraline for five months. He has got very positive results from the sertraline, mainly decreasing anxiety and improving emotional lability. He denies any side effects including dizziness, nausea/vomiting, diarrhea or fatigue. We recommend keeping the dose at 75 mg until next visit and reassess. Will continue with the current dose of Sertraline and reassess need for increasing on the next appointment.    #cannabis use may contribute to inattention, anxiety and social challenges - smokes cannabis several times daily.     Psychotropic Drug Interactions:  [PSYCHCLINICDDI]  none  Management: N/A    [place]MP was not checked today: not using controlled substances    Risk Statements:   Treatment Risk- Risks, benefits, alternatives and potential adverse effects have been discussed and are understood.   Safety Risk-Mark did not appear to be an imminent safety risk to self or others.     Plan     1) Medications:   - decrease quetiapine from 50 mg to 25 mg at bedtime for 15 days, THEN IF TOLERATED stop.  - Continue sertraline 75 mg at bedtime      2) Psychotherapy: Continues individual therapy with Hannah    3) Next due:  Labs-  CBC, CMP, TSH, lipids and HgbA1c ordered today  EKG- not indicated  Rating scales- none needed    4) Referrals:   None    5) Other: none    6) Follow-up: Return to clinic in 8 weeks     Pertinent Background                                                   [most recent eval 08/21/23]     The following section contains information  "copied from previous note by Dr Jaimes on 5/8/22 and has been reviewed, edited, and verified by the patient.     Mark first experienced anxiety for most of his childhood, until age 19 did not know his father because parents . Grew up with with IEP for LD with aggression/fights at school, depressive episodes in adolescence with SI and SIB. No hospitalizations, has been inconsistent with medications and MH care until recently.   Describes difficulty with social queues that result in frustration and anxiety that he identifies with mild autism - no psychometric testing.    Pertinent Items Include: suicide attempt , suicidal ideation, SIB , aggression and substance use: cannabis     Subjective     - Mood is \"under control\", can stay calm  - Anxiety significantly better on Sertraline  -No change in appetite or sleep.  -No SI/SIB/HI/AVH. No safety concerns  -Continues to struggles with waking up in the morning  -Denies med side effects including sexual side effects  -He resonates with ASD due to missing social cues. Interested in filling out the autism questionnaire.  -He reports being easily overwhelmed with applications or questionnaires.  -reports having learning disability as a kid. Never completed a formal assessment.   Reports interest driven attention, lack of general focus when there was a lot of reading involved, low reading level, seems fidgety, drinks two cups of coffee, he does not like the thought of stimulants, he does not want to have stimulants due to potential for misuse.    Current Social History:  Financial/occupational: food delivery  Living situation (partner, children, pets, etc): partner  Social/spiritual support: fiance  Feels safe at home: Yes    Pertinent Substance Use:   -alcohol: Yes: 3 nights a week  -cannabis: Yes: smokes cannabis several times daily    -tobacco: No  -caffeine:  Yes: 2 coffees daily   -opioids: No   Narcan Kit currrent: No   -other: none      Medical Review of " "Systems:   Lightheadedness/orthostasis: None  Headaches: None  GI: None  Sexual health concerns: None    A comprehensive review of systems was performed and is negative other than noted above.     Mental Status Exam     Alertness: alert  and oriented  Appearance: well groomed  Behavior/Demeanor: cooperative, pleasant, and calm, with good  eye contact   Speech: normal and regular rate and rhythm  Language: intact and no problems  Psychomotor: normal or unremarkable  Mood: description consistent with euthymia  Affect: full range and appropriate;Reactive, congruent to: mood- yes, content- yes  Thought Process/Associations: unremarkable  Thought Content:  Reports none;  Denies suicidal & violent ideation and delusions  Perception:  Reports none;  Denies hallucinations  Insight: good  Judgment: good  Cognition: does  appear grossly intact; formal cognitive testing was not done  Gait and Station: N/A ([employer])     Vitals   Ht 1.803 m (5' 11\")   Wt 93 kg (205 lb)   BMI 28.59 kg/m    Pulse Readings from Last 3 Encounters:   02/28/22 114   02/15/22 104   02/09/22 95     Wt Readings from Last 3 Encounters:   01/18/24 93 kg (205 lb)   02/28/22 93.4 kg (206 lb)   02/09/22 81.6 kg (180 lb)     BP Readings from Last 3 Encounters:   02/28/22 136/80   02/15/22 (!) 142/88   02/09/22 (!) 142/93         PAST MED TRIALS       Medication Max Dose (mg) Dates / Duration Helpful? DC Reason / Adverse Effects?   Celexa  20      Inadequate trial     Seroquel  200      Helps w sleep    Buspar        Sexual side effects    Lexapro  20  2023 - 6 months No   Not effective        Medications     Current Outpatient Medications   Medication Sig Dispense Refill    desonide (DESOWEN) 0.05 % external cream APPLY TWICE DAILY TO [facility], 3 WEEKS ON, 1 WEEK OF. REPEAT AS NEEDED      QUEtiapine (SEROQUEL) 25 MG tablet Take 1 tablet (25 mg) by mouth at bedtime 15 tablet 0    sertraline (ZOLOFT) 50 MG tablet Take 1.5 tablets (75 mg) by mouth daily 90 tablet " 1          Labs and Data         5/7/2023     1:31 PM 8/21/2023     2:28 PM 12/18/2023     2:36 PM   PROMIS-10 Total Score w/o Sub Scores   PROMIS TOTAL - SUBSCORES 23 40 26         5/7/2023     1:32 PM   CAGE-AID Total Score   Total Score 1   Total Score MyChart 1 (A total score of 2 or greater is considered clinically significant)         8/21/2023     2:22 PM 12/18/2023     2:31 PM 1/18/2024     3:12 PM   PHQ-9 SCORE   PHQ-9 Total Score MyChart 0 6 (Mild depression) 4 (Minimal depression)   PHQ-9 Total Score 0 6 4         9/20/2021    10:20 AM 5/7/2023     1:29 PM   JUDITH-7 SCORE   Total Score  16 (severe anxiety)   Total Score 21 16       Liver/Kidney Function, TSH Metabolic Blood counts   No lab results found.  No lab results found. Recent Labs   Lab Test 04/03/23  1139   CHOL 224*   TRIG 76   *   HDL 40     Recent Labs   Lab Test 04/03/23  1139   A1C 5.2     No lab results found. No lab results found.        No new ECG on file      Level of Medical Decision Making:   - At least 1 chronic problem that is not stable  - Engaged in prescription drug management during visit (discussed any medication benefits, side effects, alternatives, etc.)       Psychiatry Individual Psychotherapy Note   Psychotherapy start time - 4:05 PM  Psychotherapy end time - 4:25 PM  Date treatment plan last reviewed with patient - 01/18/24  Subjective: This supportive psychotherapy session addressed issues related to goals of therapy and current psychosocial stressors. Patient's reaction: Contemplation in the context of mental status appropriate for ambulatory setting.    Interactive complexity indicated? No  Plan: RTC in timeframe noted above  Psychotherapy services during this visit included myself and the patient.   Treatment Plan      SYMPTOMS; PROBLEMS   MEASURABLE GOALS;    FUNCTIONAL IMPROVEMENT / GAINS INTERVENTIONS DISCHARGE CRITERIA   ADHD: difficulty paying attention , avoids tasks which require prolonged mental effort,  and oversharing   Decreasing impulsive conversations Supportive / psychodynamic marked symptom improvement        PROVIDER: Elton Gentile MD       Patient staffed in clinic with Dr. Lee who will sign the note.  Supervisor is Dr. Garcia.

## 2024-01-18 NOTE — NURSING NOTE
Is the patient currently in the state of [place]? YES    Visit mode:VIDEO    If the visit is dropped, the patient can be reconnected by: VIDEO VISIT: Text to cell phone:   Telephone Information:   Mobile [phone]       Will anyone else be joining the visit? NO  (If patient encounters technical issues they should call [phone]    How would you like to obtain your AVS? MyChart    Are changes needed to the allergy or medication list? No    Reason for visit: RECHECK    Selina PINEDA

## 2024-01-18 NOTE — PATIENT INSTRUCTIONS
**For crisis resources, please see the information at the end of this document**   Patient Education    Thank you for coming to the [facility] MENTAL HE[facility] & ADDICTION [place] [facility]IN[facility].     Lab Testing:  If you had lab testing today and your results are reassuring or normal they will be mailed to you or sent through [employer] within 7 days. If the lab tests need quick action we will call you with the results. The phone number we will call with results is # [phone]. If this is not the best number please call our clinic and change the number.     Medication Refills:  If you need any refills please call your pharmacy and they will contact us. Our fax number for refills is [phone].   Three business days of notice are needed for general medication refill requests.   Five business days of notice are needed for controlled substance refill requests.   If you need to change to a different pharmacy, please contact the new pharmacy directly. The new pharmacy will help you get your medications transferred.     Contact Us:  Please call [phone] during business hours (8-5:00 M-F).   If you have medication related questions after clinic hours, or on the weekend, please call [phone].     Financial Assistance [phone]   Medical Records [phone]       MENTAL HEALTH CRISIS RESOURCES:  For a emergency help, please call 911 or go to the nearest Emergency Department.     Emergency Walk-In Options:   EmPATH Unit @ [facility] ([place]): [phone] - Specialized mental health emergency area designed to be calming  [facility] ([place]): [phone]  [facility] ([place]): [phone]  [facility]): [phone]    [facility] Crisis Information:   [place]: [phone]  [place]: [phone]  [place] ([place]) - Adult: [phone]     Child: [phone]  [place] - Adult: [phone]     Child: [phone]  [place]:  [phone]  List of all [facility] resources:   https://[place].gov/dhs/people-we-serve/adults/health-care/mental-health/resources/crisis-contacts.jsp    National Crisis Information:   Crisis Text Line: Text  [place]  to 043921  Suicide & Crisis Lifeline: 988  National Suicide Prevention Lifeline: 9-697-062-TALK (1-937.669.7946)       For online chat options, visit https://suicidepreventionlifeline.org/chat/  Poison Control Center: 7-930-523-5069  Trans Lifeline: 8-410-239-5258 - Hotline for transgender people of all ages  [employer]: 6-920-280-3755 - Hotline for LGBT youth     For Non-Emergency Support:   Fast Tracker: Mental Health & Substance Use Disorder Resources -   https://www.[employer].org/

## 2024-03-11 ENCOUNTER — VIRTUAL VISIT (OUTPATIENT)
Dept: PSYCHIATRY | Facility: CLINIC | Age: 34
End: 2024-03-11
Attending: PSYCHIATRY & NEUROLOGY
Payer: COMMERCIAL

## 2024-03-11 DIAGNOSIS — F41.9 ANXIETY DISORDER, UNSPECIFIED TYPE: ICD-10-CM

## 2024-03-11 DIAGNOSIS — F40.10 SOCIAL ANXIETY DISORDER: ICD-10-CM

## 2024-03-11 PROCEDURE — 99214 OFFICE O/P EST MOD 30 MIN: CPT | Mod: 95

## 2024-03-11 PROCEDURE — 90833 PSYTX W PT W E/M 30 MIN: CPT | Mod: 95

## 2024-03-11 ASSESSMENT — PATIENT HEALTH QUESTIONNAIRE - PHQ9
SUM OF ALL RESPONSES TO PHQ QUESTIONS 1-9: 1
SUM OF ALL RESPONSES TO PHQ QUESTIONS 1-9: 1
10. IF YOU CHECKED OFF ANY PROBLEMS, HOW DIFFICULT HAVE THESE PROBLEMS MADE IT FOR YOU TO DO YOUR WORK, TAKE CARE OF THINGS AT HOME, OR GET ALONG WITH OTHER PEOPLE: SOMEWHAT DIFFICULT

## 2024-03-11 NOTE — NURSING NOTE
Is the patient currently in the state of [place]? YES    Visit mode:VIDEO    If the visit is dropped, the patient can be reconnected by: VIDEO VISIT: Text to cell phone:   Telephone Information:   Mobile [phone]       Will anyone else be joining the visit? NO  (If patient encounters technical issues they should call [phone])    How would you like to obtain your AVS? MyChart    Are changes needed to the allergy or medication list? Pt stated no changes to allergies and Pt stated no med changes    Reason for visit: BALDO VILLAGRANF

## 2024-03-12 RX ORDER — SERTRALINE HYDROCHLORIDE 100 MG/1
100 TABLET, FILM COATED ORAL DAILY
Qty: 60 TABLET | Refills: 1 | Status: SHIPPED | OUTPATIENT
Start: 2024-03-12 | End: 2024-05-12

## 2024-03-12 NOTE — PATIENT INSTRUCTIONS
**For crisis resources, please see the information at the end of this document**   Patient Education    Thank you for coming to the [facility] MENTAL HEALTH & ADDICTION [place] CLIN[facility].     Lab Testing:  If you had lab testing today and your results are reassuring or normal they will be mailed to you or sent through [employer] within 7 days. If the lab tests need quick action we will call you with the results. The phone number we will call with results is # [phone]. If this is not the best number please call our clinic and change the number.     Medication Refills:  If you need any refills please call your pharmacy and they will contact us. Our fax number for refills is [phone].   Three business days of notice are needed for general medication refill requests.   Five business days of notice are needed for controlled substance refill requests.   If you need to change to a different pharmacy, please contact the new pharmacy directly. The new pharmacy will help you get your medications transferred.     Contact Us:  Please call [phone] during business hours (8-5:00 M-F).   If you have medication related questions after clinic hours, or on the weekend, please call [phone].     Financial Assistance [phone]   Medical Records [phone]       MENTAL HEALTH CRISIS RESOURCES:  For a emergency help, please call 911 or go to the nearest Emergency Department.     Emergency Walk-In Options:   EmPATH Unit @ [facility] ([place]): [phone] - Specialized mental health emergency area designed to be calming  [facility] ([place]): [phone]  [facility] ([place]): [phone]  [facility]): [phone]    [facility] Crisis Information:   [place]: [phone]  [place]: [phone]  [place] ([place]) - Adult: [phone]     Child: [phone]  [place] - Adult: [phone]     Child: [phone]  [place]:  [phone]  List of all [facility] resources:   https://[place].gov/dhs/people-we-serve/adults/health-care/mental-health/resources/crisis-contacts.jsp    National Crisis Information:   Crisis Text [place]: Text  [place]  to 568375  Suicide & Crisis Lifeline: 988  National Suicide Prevention Lifeline: 2-020-009-TALK (1-225.185.8968)       For online chat options, visit https://suicidepreventionlifeline.org/chat/  Poison Control Center: 8-221-610-7039  Trans Lifeline: 6-495-666-0865 - Hotline for transgender people of all ages  [employer]: 2-190-209-3435 - Hotline for LGBT youth     For Non-Emergency Support:   Fast Tracker: Mental Health & Substance Use Disorder Resources -   https://www.[employer].org/

## 2024-04-22 ENCOUNTER — VIRTUAL VISIT (OUTPATIENT)
Dept: PSYCHIATRY | Facility: CLINIC | Age: 34
End: 2024-04-22
Attending: PSYCHIATRY & NEUROLOGY
Payer: COMMERCIAL

## 2024-04-22 DIAGNOSIS — R45.89 EMOTIONAL DYSREGULATION: ICD-10-CM

## 2024-04-22 DIAGNOSIS — F40.10 SOCIAL ANXIETY DISORDER: Primary | ICD-10-CM

## 2024-04-22 PROCEDURE — 99214 OFFICE O/P EST MOD 30 MIN: CPT | Mod: 95

## 2024-04-22 PROCEDURE — 90833 PSYTX W PT W E/M 30 MIN: CPT | Mod: 95

## 2024-04-22 ASSESSMENT — PAIN SCALES - GENERAL: PAINLEVEL: NO PAIN (0)

## 2024-04-22 ASSESSMENT — PATIENT HEALTH QUESTIONNAIRE - PHQ9
SUM OF ALL RESPONSES TO PHQ QUESTIONS 1-9: 10
10. IF YOU CHECKED OFF ANY PROBLEMS, HOW DIFFICULT HAVE THESE PROBLEMS MADE IT FOR YOU TO DO YOUR WORK, TAKE CARE OF THINGS AT HOME, OR GET ALONG WITH OTHER PEOPLE: SOMEWHAT DIFFICULT
SUM OF ALL RESPONSES TO PHQ QUESTIONS 1-9: 10

## 2024-04-22 NOTE — PATIENT INSTRUCTIONS
**For crisis resources, please see the information at the end of this document**   Patient Education    Thank you for coming to the [facility] MENTAL HEALTH & ADDICTION [place] CLIN[facility].     Lab Testing:  If you had lab testing today and your results are reassuring or normal they will be mailed to you or sent through [employer] within 7 days. If the lab tests need quick action we will call you with the results. The phone number we will call with results is # [phone]. If this is not the best number please call our clinic and change the number.     Medication Refills:  If you need any refills please call your pharmacy and they will contact us. Our fax number for refills is [phone].   Three business days of notice are needed for general medication refill requests.   Five business days of notice are needed for controlled substance refill requests.   If you need to change to a different pharmacy, please contact the new pharmacy directly. The new pharmacy will help you get your medications transferred.     Contact Us:  Please call [phone] during business hours (8-5:00 M-F).   If you have medication related questions after clinic hours, or on the weekend, please call [phone].     Financial Assistance [phone]   Medical Records [phone]       MENTAL HEALTH CRISIS RESOURCES:  For a emergency help, please call 911 or go to the nearest Emergency Department.     Emergency Walk-In Options:   EmPATH Unit @ [facility] ([place]): [phone] - Specialized mental health emergency area designed to be calming  [facility] ([place]): [phone]  [facility] ([place]): [phone]  [facility]): [phone]    [facility] Crisis Information:   [place]: [phone]  [place]: [phone]  [place] ([place]) - Adult: [phone]     Child: [phone]  [place] - Adult: [phone]     Child: [phone]  [place]:  [phone]  List of all [facility] resources:   https://[place].gov/dhs/people-we-serve/adults/health-care/mental-health/resources/crisis-contacts.jsp    National Crisis Information:   Crisis Text Line: Text  [place]  to 570674  Suicide & Crisis Lifeline: 988  National Suicide Prevention Lifeline: 8-745-126-TALK (1-196.259.4702)       For online chat options, visit https://suicidepreventionlifeline.org/chat/  Poison Control Center: 2-640-123-5844  Trans Lifeline: 8-581-683-1062 - Hotline for transgender people of all ages  [employer]: 8-990-128-9096 - Hotline for LGBT youth     For Non-Emergency Support:   Fast Tracker: Mental Health & Substance Use Disorder Resources -   https://www.[employer].org/

## 2024-04-22 NOTE — NURSING NOTE
Is the patient currently in the state of [place]? YES    Visit mode:VIDEO    If the visit is dropped, the patient can be reconnected by: VIDEO VISIT: Text to cell phone:   Telephone Information:   Mobile [phone]       Will anyone else be joining the visit? NO  (If patient encounters technical issues they should call [phone]    How would you like to obtain your AVS? MyChart    Are changes needed to the allergy or medication list? No and N/A    Are refills needed on medications prescribed by this physician? NO    Reason for visit: RECHECK    Landon VILLAGRANF

## 2024-04-22 NOTE — PROGRESS NOTES
Virtual Visit Details    Type of service:  Video Visit   Video Start Time: 2:06 PM  Video End Time: 2:59 PM    Originating Location (pt. Location): Home    Distant Location (provider location):  On-site  Platform used for Video Visit: Braxton Flores is a 33 year old who uses the pronouns he, him, his.      Diagnoses     Social anxiety disorder  R/o unspecified mood disorder (diagnostic history of bipolar disorder)  Emotional dysregulation  R/o ADHD  Cannabis use disorder, unspecified severity, active  Learning disability (diagnosed in youth, with IEP during school)       Assessment     Mark is a 33 year old who over the past few years has started to focus on his mental health concerns.    Today we discussed the following problems:    #mood dysregulation  #impulsivity  #fear of abandonment  He has a history of seeing a psychiatrist on an involuntary basis as part of a mandatory program for a few years where he was diagnosed and treated for a diagnosis of Bipolar Disorder. He states the diagnosis never resonated with him. His concerns about mood dysregulation, impulsivity and anger management difficulties were perceived as part of Bipolar disorder.      MSI-BPD was filled out today and Mark scored 7/10 which is suggestive of cluster b traits. He works closely with his therapist and is recommended to discuss this with her and get her opinions as well.     Since being off of quetiapine he feels more awake and sharp in the morning.  Denies any side effects from the sertraline.    He denies symptoms of kayla. No signs of kayla noted today. Denied depressed mood and not outwardly depressed at this time. Will continue monitoring. Mark is not treated with a mood stabilizing medication at this time, and is taking only an antidepressant.  Should there be evidence of hypomania, kayla, bipolar disorder, a mood stabilizing medication and consideration of lowering/stopping antidepressant would be beneficial.  He has  a history of suicidal thought and behavior/suicide attempt. Today, he denied any suicidal thought and did not present as a current/immediate or recent safety risk to himself or others.     #inattention  #impulsivity  #emotional reactivity  Overlap with symptoms of BPD. He has a hx of learning disability as a kid. He was never formally assessed by a clinician for ADHD. It is likely that the learning disability and inconsistent poor performance in some subjects be due to undiagnosed ADHD. He is scheduled for an assessment with ADHD clinic in Summer 2024 which was cancelled. Today a new referral will be sent. We agreed to consider starting guanfacine with potential to help with nightmares, bedtime anxiety, and potential ADHD symptoms. Recommendation is to see PCP for new vitals. If vitals within normal range guanfacine can be ordered. We discussed how cannabis use can impact the symptoms above and recommendations to decrease use was provided.    #subjective concerns for autism  Describes difficulty with social cues that result in frustration and anxiety that he identifies as autistic behaviors. He is interested in being tested for ASD. Writer will consider sending a referral to [facility].    #anxiety around strangers  #fear of negative judgment  #avoidance of social situations  Anxiety in the social context appears to have developed in teenage years. CBT would be considered the gold standard treatment. Lexapro started in June 2023 was minimally effective at low dose and no improvement in the symptoms when tapered up to 20 mg. He could not tolerate the 20 mg for longer than a few weeks due to sexual side effects.    Sertraline was increased to 100 mg last visit. He has got very positive results from the sertraline, mainly decreasing anxiety and improving emotional lability. He denies any side effects including dizziness, nausea/vomiting, diarrhea or fatigue. He is interested in staying at the current dose of 100  mg.    #cannabis use - Mark has been using cannabis daily. We discussed that this may contribute to inattention, anxiety and social challenges - smokes cannabis several times daily. He is in the pre-contemplative stage of change.    Psychotropic Drug Interactions:  [PSYCHCLINICDDI]  none  Management: N/A    [place] was not checked today: not using controlled substances    Risk Statements:   Treatment Risk- Risks, benefits, alternatives and potential adverse effects have been discussed and are understood.   Safety Risk-Mark did not appear to be an imminent safety risk to self or others.     Plan     1) Medications:   - Start Intuniv 1 mg at bedtime  - Continue sertraline 100 mg at bedtime    2) Psychotherapy: Continues individual therapy with Hannah    3) Next due:  Labs-  CBC, CMP, TSH, lipids and HgbA1c not completed  EKG- not indicated  Rating scales- none needed    4) Referrals:   AD[facility] clinic at [facility]    5) Other: none    6) Follow-up: Return to clinic in 4 weeks     Pertinent Background                                                   [most recent eval 08/21/23]     The following section contains information copied from previous note by Dr Jaimes on 5/8/22 and has been reviewed, edited, and verified by the patient.     Mark first experienced anxiety for most of his childhood, until age 19 did not know his father because parents . Grew up with with IEP for LD with aggression/fights at school, depressive episodes in adolescence with SI and SIB. No hospitalizations, has been inconsistent with medications and MH care until recently.   Describes difficulty with social cues that result in frustration and anxiety that he identifies with mild autism - no psychometric testing.    Pertinent Items Include: suicide attempt , suicidal ideation, SIB , aggression and substance use: cannabis     Subjective     -It's been easier to manage stress for example related to the upcoming wedding.  -He would normally get  "overwhelmed and anxious but that has not been the case.  -Has been optimistic about the wedding.  -Ever since on Sertraline he has been \"more positive than negative\"  -No SI/SIB/HI  -Sleep has been OK. He still has weekly nightmares. He wakes up in a state of anxiety and he has got into the habit of getting up and smoke cannabis before going back to bed otherwise he would continue to be anxious and would have a bad next day.  -Since being off of quetiapine he feels more awake and sharp in the morning.  -Denies any side effects from the sertraline.  -Cannabis use is daily. He communicated that he is going to continue using marijuana. No intention to cut down or stop.  -When upset or angry the negative emotion is quickly controlled when in contact with an animal. IVONNE was discussed.    Answers submitted by the patient for this visit:  Patient Health Questionnaire (Submitted on 4/22/2024)  If you checked off any problems, how difficult have these problems made it for you to do your work, take care of things at home, or get along with other people?: Somewhat difficult  PHQ9 TOTAL SCORE: 10      Current Social History:  Financial/occupational: food delivery  Living situation (partner, children, pets, etc): partner  Social/spiritual support: fiance  Feels safe at home: Yes    Pertinent Substance Use:   -alcohol: Yes: 3 nights a week  -cannabis: Yes: smokes cannabis several times daily    -tobacco: No  -caffeine:  Yes: 2 coffees daily   -opioids: No   Narcan Kit currrent: No   -other: none      Medical Review of Systems:   Lightheadedness/orthostasis: None  Headaches: None  GI: None  Sexual health concerns: None    A comprehensive review of systems was performed and is negative other than noted above.     Mental Status Exam     Alertness: alert  and oriented  Appearance: well groomed  Behavior/Demeanor: cooperative, pleasant, and calm, with good  eye contact   Speech: normal and regular rate and rhythm  Language: intact and " no problems  Psychomotor: normal or unremarkable  Mood: description consistent with euthymia  Affect: full range and appropriate;Reactive, congruent to: mood- yes, content- yes  Thought Process/Associations: unremarkable  Thought Content:  Reports none;  Denies suicidal & violent ideation and delusions  Perception:  Reports none;  Denies hallucinations  Insight: good  Judgment: good  Cognition: does  appear grossly intact; formal cognitive testing was not done  Gait and Station: N/A (telehealth)     Vitals   There were no vitals taken for this visit.  Pulse Readings from Last 3 Encounters:   02/28/22 114   02/15/22 104   02/09/22 95     Wt Readings from Last 3 Encounters:   01/18/24 93 kg (205 lb)   02/28/22 93.4 kg (206 lb)   02/09/22 81.6 kg (180 lb)     BP Readings from Last 3 Encounters:   02/28/22 136/80   02/15/22 (!) 142/88   02/09/22 (!) 142/93         PAST MED TRIALS       Medication Max Dose (mg) Dates / Duration Helpful? DC Reason / Adverse Effects?   Celexa  20      Inadequate trial     Seroquel  200      Helps w sleep    Buspar        Sexual side effects    Lexapro  20  2023 - 6 months No   Not effective        Medications     Current Outpatient Medications   Medication Sig Dispense Refill    desonide (DESOWEN) 0.05 % external cream APPLY TWICE DAILY TO LSC, 3 WEEKS ON, 1 WEEK OF. REPEAT AS NEEDED      sertraline (ZOLOFT) 100 MG tablet Take 1 tablet (100 mg) by mouth daily 60 tablet 1          Labs and Data         5/7/2023     1:31 PM 8/21/2023     2:28 PM 12/18/2023     2:36 PM   PROMIS-10 Total Score w/o Sub Scores   PROMIS TOTAL - SUBSCORES 23 40 26         5/7/2023     1:32 PM   CAGE-AID Total Score   Total Score 1   Total Score MyChart 1 (A total score of 2 or greater is considered clinically significant)         12/18/2023     2:31 PM 1/18/2024     3:12 PM 3/11/2024     3:18 PM   PHQ-9 SCORE   PHQ-9 Total Score MyChart 6 (Mild depression) 4 (Minimal depression) 1 (Minimal depression)   PHQ-9  Total Score 6 4 1         9/20/2021    10:20 AM 5/7/2023     1:29 PM   JUDITH-7 SCORE   Total Score  16 (severe anxiety)   Total Score 21 16       Liver/Kidney Function, TSH Metabolic Blood counts   No lab results found.  No lab results found. Recent Labs   Lab Test 04/03/23  1139   CHOL 224*   TRIG 76   *   HDL 40     Recent Labs   Lab Test 04/03/23  1139   A1C 5.2     No lab results found. No lab results found.        No new ECG on file      Level of Medical Decision Making:   - At least 1 chronic problem that is not stable  - Engaged in prescription drug management during visit (discussed any medication benefits, side effects, alternatives, etc.)       Psychiatry Individual Psychotherapy Note   Psychotherapy start time - 2:59 PM  Psychotherapy end time - 2:40 PM  Date treatment plan last reviewed with patient - 01/18/24  Subjective: This supportive psychotherapy session addressed issues related to goals of therapy and current psychosocial stressors. Patient's reaction: Contemplation in the context of mental status appropriate for ambulatory setting.    Interactive complexity indicated? No  Plan: RTC in timeframe noted above  Psychotherapy services during this visit included myself and the patient.   Treatment Plan      SYMPTOMS; PROBLEMS   MEASURABLE GOALS;    FUNCTIONAL IMPROVEMENT / GAINS INTERVENTIONS DISCHARGE CRITERIA   ADHD: difficulty paying attention , avoids tasks which require prolonged mental effort, and oversharing   Decreasing impulsive conversations Supportive / psychodynamic marked symptom improvement        PROVIDER: Elton Gentile MD       Patient staffed in clinic with Dr. Garcia who will sign the note. Supervisor is Dr. Garcia.

## 2024-05-08 ENCOUNTER — ALLIED HEALTH/NURSE VISIT (OUTPATIENT)
Dept: INTERNAL MEDICINE | Facility: CLINIC | Age: 34
End: 2024-05-08

## 2024-05-08 VITALS — SYSTOLIC BLOOD PRESSURE: 124 MMHG | DIASTOLIC BLOOD PRESSURE: 86 MMHG | HEART RATE: 92 BPM

## 2024-05-08 DIAGNOSIS — F31.0 BIPOLAR AFFECTIVE DISORDER, CURRENT EPISODE HYPOMANIC (H): Primary | ICD-10-CM

## 2024-05-08 PROCEDURE — 99207 PR NO CHARGE NURSE ONLY: CPT

## 2024-05-12 RX ORDER — SERTRALINE HYDROCHLORIDE 100 MG/1
100 TABLET, FILM COATED ORAL DAILY
Qty: 60 TABLET | Refills: 1 | Status: SHIPPED | OUTPATIENT
Start: 2024-05-12 | End: 2024-09-03

## 2024-05-12 RX ORDER — GUANFACINE 1 MG/1
1 TABLET, EXTENDED RELEASE ORAL AT BEDTIME
Qty: 30 TABLET | Refills: 1 | Status: SHIPPED | OUTPATIENT
Start: 2024-05-12 | End: 2024-07-10

## 2024-06-05 DIAGNOSIS — R45.89 EMOTIONAL DYSREGULATION: ICD-10-CM

## 2024-06-05 RX ORDER — GUANFACINE 1 MG/1
1 TABLET, EXTENDED RELEASE ORAL AT BEDTIME
Qty: 90 TABLET | Refills: 1 | OUTPATIENT
Start: 2024-06-05

## 2024-06-14 ENCOUNTER — MYC REFILL (OUTPATIENT)
Dept: PSYCHIATRY | Facility: CLINIC | Age: 34
End: 2024-06-14

## 2024-06-14 DIAGNOSIS — R45.89 EMOTIONAL DYSREGULATION: ICD-10-CM

## 2024-06-14 RX ORDER — GUANFACINE 1 MG/1
1 TABLET, EXTENDED RELEASE ORAL AT BEDTIME
Qty: 30 TABLET | Refills: 1 | Status: CANCELLED | OUTPATIENT
Start: 2024-06-14

## 2024-06-23 ENCOUNTER — HEALTH MAINTENANCE LETTER (OUTPATIENT)
Age: 34
End: 2024-06-23

## 2024-07-09 ENCOUNTER — MYC REFILL (OUTPATIENT)
Dept: PSYCHIATRY | Facility: CLINIC | Age: 34
End: 2024-07-09

## 2024-07-09 DIAGNOSIS — F40.10 SOCIAL ANXIETY DISORDER: ICD-10-CM

## 2024-07-09 DIAGNOSIS — R45.89 EMOTIONAL DYSREGULATION: ICD-10-CM

## 2024-07-09 RX ORDER — SERTRALINE HYDROCHLORIDE 100 MG/1
100 TABLET, FILM COATED ORAL DAILY
Qty: 60 TABLET | Refills: 1 | Status: CANCELLED | OUTPATIENT
Start: 2024-07-09

## 2024-07-09 RX ORDER — GUANFACINE 1 MG/1
1 TABLET, EXTENDED RELEASE ORAL AT BEDTIME
Qty: 30 TABLET | Refills: 1 | Status: CANCELLED | OUTPATIENT
Start: 2024-07-09

## 2024-07-10 DIAGNOSIS — R45.89 EMOTIONAL DYSREGULATION: ICD-10-CM

## 2024-07-10 RX ORDER — GUANFACINE 1 MG/1
1 TABLET, EXTENDED RELEASE ORAL AT BEDTIME
Qty: 30 TABLET | Refills: 0 | Status: SHIPPED | OUTPATIENT
Start: 2024-07-10 | End: 2024-08-05

## 2024-07-10 NOTE — TELEPHONE ENCOUNTER
Last seen: 4/22/24  RTC: 4 weeks  Cancel: none  No-show: none  Next appt: none     Incoming refill from [facility] via fax    Medication requested:   Pending Prescriptions:                       Disp   Refills    guanFACINE (INTUNIV) 1 MG TB24 24 hr tabl*30 tab*0            Sig: TAKE 1 TABLET BY MOUTH AT BEDTIME        From chart note:   - Start Intuniv 1 mg at bedtime      Medication refill approved per refill protocol. Courtesy 30 day refill approved per protocol with directions to schedule appointment for further refills.

## 2024-08-03 DIAGNOSIS — R45.89 EMOTIONAL DYSREGULATION: ICD-10-CM

## 2024-08-05 RX ORDER — GUANFACINE 1 MG/1
1 TABLET, EXTENDED RELEASE ORAL AT BEDTIME
Qty: 30 TABLET | Refills: 0 | Status: SHIPPED | OUTPATIENT
Start: 2024-08-05 | End: 2024-08-13

## 2024-08-05 NOTE — TELEPHONE ENCOUNTER
"Date of Last Office Visit: 4/22/2024  [facility]      RTC: 4 wks  No shows: 0  Cancellations: 0  Date of Next Office Visit:   8/13/2024 10:10 AM (60 min)  Katharine    Arrive by:  9:55 AM   ADULT PSYCHIATRY RETURN    [facility] ([facility])   Gely Dinh MD     ------------------------------    Incoming refill from Pharmacy via interface  Medication requested:    guanFACINE (INTUNIV) 1 MG TB24 24 hr tablet 30 tablet 0 7/10/2024 -- No   Sig - Route: Take 1 tablet (1 mg) by mouth at bedtime Schedule appointment for further refills [phone] - Oral     ------------------------------  From last visit note:   \"- Start Intuniv 1 mg at bedtime \"       Refill decision: Medication refilled 30d per protocol.                     "

## 2024-08-13 ENCOUNTER — VIRTUAL VISIT (OUTPATIENT)
Dept: PSYCHIATRY | Facility: CLINIC | Age: 34
End: 2024-08-13
Attending: PSYCHIATRY & NEUROLOGY
Payer: COMMERCIAL

## 2024-08-13 DIAGNOSIS — F12.90 CANNABIS USE DISORDER: ICD-10-CM

## 2024-08-13 DIAGNOSIS — R45.89 EMOTIONAL DYSREGULATION: Primary | ICD-10-CM

## 2024-08-13 DIAGNOSIS — F40.10 SOCIAL ANXIETY DISORDER: ICD-10-CM

## 2024-08-13 PROCEDURE — 90792 PSYCH DIAG EVAL W/MED SRVCS: CPT | Mod: 95

## 2024-08-13 RX ORDER — GUANFACINE 1 MG/1
2 TABLET, EXTENDED RELEASE ORAL AT BEDTIME
Qty: 30 TABLET | Refills: 2 | Status: SHIPPED | OUTPATIENT
Start: 2024-08-13 | End: 2024-09-24

## 2024-08-13 ASSESSMENT — PATIENT HEALTH QUESTIONNAIRE - PHQ9
SUM OF ALL RESPONSES TO PHQ QUESTIONS 1-9: 11
10. IF YOU CHECKED OFF ANY PROBLEMS, HOW DIFFICULT HAVE THESE PROBLEMS MADE IT FOR YOU TO DO YOUR WORK, TAKE CARE OF THINGS AT HOME, OR GET ALONG WITH OTHER PEOPLE: SOMEWHAT DIFFICULT
SUM OF ALL RESPONSES TO PHQ QUESTIONS 1-9: 11

## 2024-08-13 ASSESSMENT — ANXIETY QUESTIONNAIRES
7. FEELING AFRAID AS IF SOMETHING AWFUL MIGHT HAPPEN: SEVERAL DAYS
GAD7 TOTAL SCORE: 14
GAD7 TOTAL SCORE: 14
8. IF YOU CHECKED OFF ANY PROBLEMS, HOW DIFFICULT HAVE THESE MADE IT FOR YOU TO DO YOUR WORK, TAKE CARE OF THINGS AT HOME, OR GET ALONG WITH OTHER PEOPLE?: SOMEWHAT DIFFICULT
GAD7 TOTAL SCORE: 14

## 2024-08-13 ASSESSMENT — PAIN SCALES - GENERAL: PAINLEVEL: NO PAIN (0)

## 2024-08-13 NOTE — PATIENT INSTRUCTIONS
Medication Changes:  -Increase Intuniv from 1mg to 2mg.     Other;   Referral for CBT (cognitive behavioral therapy)    Resources for sleep hygiene and cognitive behavioral therapy for sleep:  https://kcmsprodmccmscntntncstg.blob.Quartics.[email or web address]/PatientEducation/PatientLearning/[email or web address].html#/  https://namica.org/blog/better-sleep-to-maintain-mental-health/    **For crisis resources, please see the information at the end of this document**   Patient Education    Thank you for coming to the [facility] [place] [facility].     Lab Testing:  If you had lab testing today and your results are reassuring or normal they will be mailed to you or sent through [employer] within 7 days. If the lab tests need quick action we will call you with the results. The phone number we will call with results is # [phone]. If this is not the best number please call our clinic and change the number.     Medication Refills:  If you need any refills please call your pharmacy and they will contact us. Our fax number for refills is [phone].   Three business days of notice are needed for general medication refill requests.   Five business days of notice are needed for controlled substance refill requests.   If you need to change to a different pharmacy, please contact the new pharmacy directly. The new pharmacy will help you get your medications transferred.     Contact Us:  Please call [phone] during business hours (8-5:00 M-F).   If you have medication related questions after clinic hours, or on the weekend, please call [phone].     Financial Assistance [phone]   Medical Records [phone]       MENTAL HEALTH CRISIS RESOURCES:  For a emergency help, please call 911 or go to the nearest Emergency Department.     Emergency Walk-In Options:   Em[facility] Unit @ [place] [facility] [place]): [phone] - Specialized mental health emergency area designed to be calming  [facility]  [place] ([place]): [phone]  [facility] ([place]): [phone]  [facility] ([place]): [phone]    [facility] Crisis Information:   [place]: [phone]  [place]: [phone]  [place] ([place]) - Adult: [phone]     Child: [phone]  [place] - Adult: [phone]     Child: [phone]  [place]: [phone]  List of all [facility] resources:   https://[place].gov/dhs/people-we-serve/adults/health-care/mental-health/resources/crisis-contacts.jsp    National Crisis Information:   Crisis Text Line: Text  [place]  to 085803  Suicide & Crisis Lifeline: 988  National Suicide Prevention Lifeline: 0-924-950-TALK (1-130.727.5520)       For online chat options, visit https://suicidepreventionlifeline.org/chat/  Poison Control Center: 7-476-353-5377  Trans Lifeline: 6-792-988-6758 - Hotline for transgender people of all ages  The [employer]: 1-254-547-2753 - Hotline for LGBT youth     For Non-Emergency Support:   Fast Tracker: Mental Health & Substance Use Disorder Resources -   https://www.[employer] ([employer]n.org/
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
## 2024-08-13 NOTE — NURSING NOTE
Current patient location: [address]  [facility] [address]    Is the patient currently in the state of [place]? YES    Visit mode:VIDEO    If the visit is dropped, the patient can be reconnected by: VIDEO VISIT: Text to cell phone:   Telephone Information:   Mobile [phone]       Will anyone else be joining the visit? NO  (If patient encounters technical issues they should call [phone]    How would you like to obtain your AVS? MyChart    Are changes needed to the allergy or medication list? No    Are refills needed on medications prescribed by this physician? NO    Rooming Documentation:  Assigned questionnaire(s) completed      Reason for visit: BALDO VILLAGRANF

## 2024-08-30 ENCOUNTER — MYC REFILL (OUTPATIENT)
Dept: PSYCHIATRY | Facility: CLINIC | Age: 34
End: 2024-08-30

## 2024-08-30 DIAGNOSIS — R45.89 EMOTIONAL DYSREGULATION: ICD-10-CM

## 2024-08-30 RX ORDER — GUANFACINE 1 MG/1
2 TABLET, EXTENDED RELEASE ORAL AT BEDTIME
Qty: 30 TABLET | Refills: 2 | Status: CANCELLED | OUTPATIENT
Start: 2024-08-30

## 2024-09-02 DIAGNOSIS — F40.10 SOCIAL ANXIETY DISORDER: ICD-10-CM

## 2024-09-03 RX ORDER — SERTRALINE HYDROCHLORIDE 100 MG/1
100 TABLET, FILM COATED ORAL DAILY
Qty: 30 TABLET | Refills: 0 | Status: SHIPPED | OUTPATIENT
Start: 2024-09-03 | End: 2024-09-24

## 2024-09-04 NOTE — TELEPHONE ENCOUNTER
"Date of Last Office Visit: 8/13/2024  [facility]      RTC: 4 weeks   No shows: 0  Cancellations: 0  Date of Next Office Visit:    9/24/2024 10:40 AM (30 min)  Katharine   Arrive by: 10:25 AM   ADULT PSYCHIATRY RETURN    URPSY (P [facility])   Gely Dinh MD     ------------------------------    Medication requested:    sertraline (ZOLOFT) 100 MG tablet 60 tablet 1 5/12/2024 -- No   Sig - Route: Take 1 tablet (100 mg) by mouth daily - Oral     ------------------------------  From last visit note:   \" Increase Intuniv from 1 mg to 2mg at bedtime  - Continue sertraline 100 mg at bedtime. Can consider increasing at future visits. \"       Refill decision: Medication refilled per protocol.                         "

## 2024-09-24 ENCOUNTER — VIRTUAL VISIT (OUTPATIENT)
Dept: PSYCHIATRY | Facility: CLINIC | Age: 34
End: 2024-09-24
Attending: STUDENT IN AN ORGANIZED HEALTH CARE EDUCATION/TRAINING PROGRAM
Payer: COMMERCIAL

## 2024-09-24 DIAGNOSIS — F40.10 SOCIAL ANXIETY DISORDER: ICD-10-CM

## 2024-09-24 DIAGNOSIS — R45.89 EMOTIONAL DYSREGULATION: ICD-10-CM

## 2024-09-24 DIAGNOSIS — F12.90 CANNABIS USE DISORDER: Primary | ICD-10-CM

## 2024-09-24 PROCEDURE — G2211 COMPLEX E/M VISIT ADD ON: HCPCS | Mod: 95

## 2024-09-24 PROCEDURE — 99214 OFFICE O/P EST MOD 30 MIN: CPT | Mod: 95

## 2024-09-24 RX ORDER — GUANFACINE 1 MG/1
2 TABLET, EXTENDED RELEASE ORAL AT BEDTIME
Qty: 30 TABLET | Refills: 2 | Status: SHIPPED | OUTPATIENT
Start: 2024-09-24

## 2024-09-24 RX ORDER — SERTRALINE HYDROCHLORIDE 100 MG/1
100 TABLET, FILM COATED ORAL DAILY
Qty: 30 TABLET | Refills: 2 | Status: SHIPPED | OUTPATIENT
Start: 2024-09-24

## 2024-09-24 ASSESSMENT — PATIENT HEALTH QUESTIONNAIRE - PHQ9
SUM OF ALL RESPONSES TO PHQ QUESTIONS 1-9: 2
10. IF YOU CHECKED OFF ANY PROBLEMS, HOW DIFFICULT HAVE THESE PROBLEMS MADE IT FOR YOU TO DO YOUR WORK, TAKE CARE OF THINGS AT HOME, OR GET ALONG WITH OTHER PEOPLE: NOT DIFFICULT AT ALL
SUM OF ALL RESPONSES TO PHQ QUESTIONS 1-9: 2

## 2024-09-24 ASSESSMENT — PAIN SCALES - GENERAL: PAINLEVEL: NO PAIN (0)

## 2024-09-24 NOTE — NURSING NOTE
Current patient location: [address]  [facility] [address]    Is the patient currently in the state of [place]? YES    Visit mode:VIDEO    If the visit is dropped, the patient can be reconnected by: VIDEO VISIT: Text to cell phone:   Telephone Information:   Mobile [phone]       Will anyone else be joining the visit? NO  (If patient encounters technical issues they should call [phone])    How would you like to obtain your AVS? MyChart    Are changes needed to the allergy or medication list? No    Are refills needed on medications prescribed by this physician? NO    Rooming Documentation:  Questionnaire(s) completed    Reason for visit: BALDO VILLAGRANF

## 2024-09-24 NOTE — PROGRESS NOTES
"Virtual Visit Details    Type of service:  Video Visit     Originating Location (pt. Location): {video visit patient location:217546::\"Home\"}  {PROVIDER LOCATION On-site should be selected for visits conducted from your clinic location or adjoining [facility] hospital, academic office, or other nearby [facility] building. Off-site should be selected for all other provider locations, including home:752001}  Distant Location (provider location):  {virtual location provider:044041}  Platform used for Video Visit: {Virtual Visit Platforms:812770::\"AmWell\"}       [facility]  {CLINIC TEAM:502489}  {VISIT TYPE:004606}       CARE TEAM:    PCP- Lior Lan  Therapist- {NONE:810064}  {prov:236843}    Mark is a 34 year old who uses the pronouns {:434559}.                   Assessment & Plan   {Must include bolded diagnoses. Assessment can be narrative or per problem:486079}  ***    Psychotropic Drug Interactions:  {[PSYCHCLINICDDI]:691108}  {psyDDI:005537}  Management: {di:535486}    MNPMP {was:586134::\"was\"} checked today: {P:442352}    Risk Statements:   Treatment Risk: Risks, benefits, alternatives and potential adverse effects have been discussed and are understood.   Safety Risk: {safe:911452}    PLAN    1) Medications:   - ***    2) Psychotherapy: ***    3) Next due:  Labs: {:640480::Routine monitoring is not indicated for current psychotropic medication regimen}   EKG: {:504690::Routine monitoring is not indicated for current psychotropic medication regimen}   Rating scales: {PSYCHRATINGSCALES:518727}    4) Referrals: ***    5) Follow-up: Return to clinic in ***                     Interval History   -Got , went to [facility] for honeymoon.   -Everything went well with that.   -Living with partner and 2 roommates-didn't change.   -Everything still going pretty well.   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    -Things seem to be going fine and great with boss.    -He's more understand of where he's coming from with his . Mental.     -Therapist: With wedding and everything going on, didn't have a chance.     -Mood: A lot better.    -Spending time with mom, dad, and wife for the week, anxiety + anger has gone down. Was able to stay calm throughout most of it. One time losing cool but during entire wedding, ceremony even when things didn't seem like they were going right.    -Now is able to stay calm until things actually do become an issue. Before would get anxious/worried before hand.     -Things that helped: No completely sure.   -Thinks anxiety medication helped a lot . Thinks increased Intuniv has helped a lot with fight/flight.   -No side effects but starting to get ot flashes. Always got hot easily. Noticing that  when he' shouldn't be getting really hot, he does. And is sweating more.    -It's hard to tell if it's medication related, has always gotten hot easily.    -Noticed this in the last month.    -This entire summer, has been over sweating.     -Open to monitoring this for now before making changes.     -Depression:   -Not a big concern now.    -Still has times where he doesn't feel like doing much and just sleep. But doesn't happen as much.    -This typically happens when he gets extremely overwhlem. Mostly happened a couple times around the wedding.     -Si/HI: No    -Very occasionally, will have an impulse to punch something.    -When younger struggled more with SIB.     Substances:  -Cannabis : Reviewed risks. Cannabis isn't a priority right now. Focusing on alcohol.    -Does have cannabis withdrawal.s Sweats, appetite goes away, hard to sleep.   -Alcohol: Cutting down on this. Hasn't had anything to drink since honeymoon. Felt like he was starting to have less control after drinking 2 drinks. Didn't feel like he neeed to drink all the time it was more when he started.    -No  "fhx of withdrawal.    -Denying withdrawals, cravings.    - Not interested in MAT or any additional support.    -Motivated for health reasons too. Has been thinking about this for a while now.   -Drinking or using cannabis while driving: NO.   -Nicotine: Very rarely when drinking.   -Mushrooms: Haven't in 6 months--1-2 times year.       -Discussed monitoring things for now since stress levels will be less .     Plan:   -Send Mental health  referral phone number. : [phone] . [employer] message.   -Keep medications.   -Discussed labs.   -Follow up: 6 weeks.   -Pharmacy: [facility] in [place].     Current Social History:  Financial/occupational: ***  Living situation{partner, children, pets, etc:023457}: ***  Social/spiritual support: ***      Pertinent Substance Use:  [Last updated 09/24/24]  Alcohol: {:60}  Cannabis: {:60}  Tobacco: {:60}  Caffeine:  {:60}  Opioids: {:60}   Narcan Kit current: {:60::N/A}  Other substances: {:60}     Medical Review of Systems / Med sfx:   {Optional sections - USE TO ASSESS FOR MED SFX:338811::\"A comprehensive review of systems was performed and is negative other than noted above.\"}    Contraception: {:60}                  Summary Points of Current Care  ***/20***: ***                  Physical Exam  (Vitals Only)    There were no vitals taken for this visit.  Pulse Readings from Last 3 Encounters:   05/08/24 92   02/28/22 114   02/15/22 104     Wt Readings from Last 3 Encounters:   01/18/24 93 kg (205 lb)   02/28/22 93.4 kg (206 lb)   02/09/22 81.6 kg (180 lb)     BP Readings from Last 3 Encounters:   05/08/24 124/86   02/28/22 136/80   02/15/22 (!) 142/88                      Mental Status Exam    Alertness: {ALERTNESS DESCRIPTION:985821}  Appearance: {a:960483}  Behavior/Demeanor: {BEHAVIOR Description:238634}, with {d:002795} eye contact   Speech: {SPEECH Description:966756}  Language: {LANGUAGE Description:321155}  Psychomotor: {p:401911}  Mood: {m:364378}  Affect: " {a:079312}; congruent to: mood- {:175452}, content- {:130009}  Thought Process/Associations: {THOUGHT PROCESS Description:317957}  Thought Content:  Reports {t:395236};  Denies {t:679095}  Perception:  Reports {p:177977};  Denies {p:970249}  Insight: {INSIGHT Description:791650}  Judgment: {JUDGMENT Description:071201}  Cognition: {co}  Gait and Station: {}                  Past Psychotropic Medication Trials     Medication Max Dose (mg) Dates / Duration Helpful? DC Reason / Adverse Effects?                                                                                  Past Medical History     Patient Active Problem List   Diagnosis    Bipolar disorder (H)    Marijuana use, episodic                     Medications     Current Outpatient Medications   Medication Sig Dispense Refill    desonide (DESOWEN) 0.05 % external cream APPLY TWICE DAILY TO [facility], 3 WEEKS ON, 1 WEEK OF. REPEAT AS NEEDED      guanFACINE (INTUNIV) 1 MG TB24 24 hr tablet Take 2 tablets (2 mg) by mouth at bedtime 30 tablet 2    sertraline (ZOLOFT) 100 MG tablet Take 1 tablet (100 mg) by mouth daily. 30 tablet 0                     Data         2023     2:36 PM 2024     1:53 PM 2024    10:00 AM   PROMIS-10 Total Score w/o Sub Scores   PROMIS TOTAL - SUBSCORES 26 26 25         2024     1:49 PM 2024     9:54 AM 2024    10:38 AM   PHQ-9 SCORE   PHQ-9 Total Score MyChart 10 (Moderate depression) 11 (Moderate depression) 2 (Minimal depression)   PHQ-9 Total Score 10 11 2         2021    10:20 AM 2023     1:29 PM 2024     9:55 AM   JUDITH-7 SCORE   Total Score  16 (severe anxiety) 14 (moderate anxiety)   Total Score 21 16 14       Liver/Kidney Function, TSH Metabolic Blood counts   No lab results found.  No lab results found. Recent Labs   Lab Test 23  1139   CHOL 224*   TRIG 76   *   HDL 40     Recent Labs   Lab Test 23  1139   A1C 5.2     No lab results found. No lab results  "found.      {Only keep ECG results >1 yr old if QTc>460ms:385083}  ECG *** QTc = ***ms      PROVIDER: Gely Dinh MD    {Fac:670930::\"Patient staffed in clinic with  *** who will sign the note.  Supervisor is  ***. \"}   " 18.   -Amount varies from day to day. Activities. A high stress day-3-5 bowls/day. If a low stress day-1-2 bowls a day. Smokes a lot of weed during D&D.   -Discussed risks with use: attention, sleep.            -Q once in a while, to decrease tolerance. T breaks. Has withdrawal during the first week: Appetite, nausea.            -Thought about long term abstinence. The more it's becoming legal, the more thinking about it less and less. Thoroughly enjoy and has helped in a lot of aspects.            -No smoking and driving. So needs something for the day-high anxiety and why he needs.   Caffeine: Not Later in the day.   Alcohol: Previously reported 1x/week. Varies depending on D&D. 4-5 beers.Does not feel problematic. Today (9/24/24) reported cutting down.     - Hasn't had anything to drink since honeymoon. Felt like he was starting to have less control after drinking 2 drinks. Didn't feel like he need to drink all the time it was more when he started, it was difficult to stop.    -No hx of withdrawal.    -Denying withdrawals, cravings.    - Not interested in MAT or any additional support.    -Motivated for health reasons too. Has been thinking about this for a while now.   -Drinking or using cannabis while driving: No.    -Nicotine: Very rarely when drinking.   -Other Substances: 2-3x/year, a small amount of mushrooms.Nothing else including stimulants.      Psych and Medical ROS per HPI                Summary Points of Current Care  *Carried forward from last psychiatry progress note-will continue to update accordingly*  The following section contains information copied from previous note by Dr Jaimes on 5/8/22 and has been reviewed, edited, and verified by the patient.     Mark first experienced anxiety for most of his childhood, until age 19 did not know his father because parents . Grew up with with IEP for LD with aggression/fights at school, depressive episodes in adolescence with SI and SIB. No  hospitalizations, has been inconsistent with medications and  care until recently.   Describes difficulty with social cues that result in frustration and anxiety that he identifies with mild autism - no psychometric testing.     Pertinent Items Include: suicide attempt , suicidal ideation, SIB , aggression and substance use: cannabis                  Physical Exam  (Vitals Only)    There were no vitals taken for this visit.  Pulse Readings from Last 3 Encounters:   05/08/24 92   02/28/22 114   02/15/22 104     Wt Readings from Last 3 Encounters:   01/18/24 93 kg (205 lb)   02/28/22 93.4 kg (206 lb)   02/09/22 81.6 kg (180 lb)     BP Readings from Last 3 Encounters:   05/08/24 124/86   02/28/22 136/80   02/15/22 (!) 142/88                      Mental Status Exam    Alertness: alert  and oriented  Appearance: well groomed  Behavior/Demeanor: cooperative, pleasant, and calm, with good  eye contact   Speech: normal and regular rate and rhythm  Language: intact and no problems  Psychomotor: no psychomotor agitation, retardation or abnormal involuntary movements appreciated  Mood: good  Affect: full range and appropriate;Reactive, bright at times, congruent to: mood- yes, content- yes  Thought Process/Associations: linear, logical, goal directed  Thought Content:  Reports none;  Denies suicidal & violent ideation and delusions  Perception:  Reports none;  Denies hallucinations  Insight: fair  Judgment: fair  Cognition: does  appear grossly intact; formal cognitive testing was not done  Gait and Station: N/A (telehealth)                Past Psychotropic Medication Trials    *Carried forward from last psychiatry progress note-will continue to update accordingly*   Medication Max Dose (mg) Dates / Duration Helpful? DC Reason / Adverse Effects?   Celexa  20      Inadequate trial     Seroquel  200      Helps w sleep    Buspar        Sexual side effects    Lexapro  20  2023 - 6 months No   Not effective   Intuniv   current                      Past Medical History     Patient Active Problem List   Diagnosis    Bipolar disorder (H)    Marijuana use, episodic                     Medications     Current Outpatient Medications   Medication Sig Dispense Refill    desonide (DESOWEN) 0.05 % external cream APPLY TWICE DAILY TO [facility], 3 WEEKS ON, 1 WEEK OF. REPEAT AS NEEDED      guanFACINE (INTUNIV) 1 MG TB24 24 hr tablet Take 2 tablets (2 mg) by mouth at bedtime 30 tablet 2    sertraline (ZOLOFT) 100 MG tablet Take 1 tablet (100 mg) by mouth daily. 30 tablet 0                     Data         12/18/2023     2:36 PM 4/22/2024     1:53 PM 8/13/2024    10:00 AM   PROMIS-10 Total Score w/o Sub Scores   PROMIS TOTAL - SUBSCORES 26 26 25         4/22/2024     1:49 PM 8/13/2024     9:54 AM 9/24/2024    10:38 AM   PHQ-9 SCORE   PHQ-9 Total Score MyChart 10 (Moderate depression) 11 (Moderate depression) 2 (Minimal depression)   PHQ-9 Total Score 10 11 2         9/20/2021    10:20 AM 5/7/2023     1:29 PM 8/13/2024     9:55 AM   JUDITH-7 SCORE   Total Score  16 (severe anxiety) 14 (moderate anxiety)   Total Score 21 16 14       Liver/Kidney Function, TSH Metabolic Blood counts   No lab results found.  No lab results found. Recent Labs   Lab Test 04/03/23  1139   CHOL 224*   TRIG 76   *   HDL 40     Recent Labs   Lab Test 04/03/23  1139   A1C 5.2     No lab results found. No lab results found.     PROVIDER:   Gely Dinh MD  PGY-3 Psychiatry  [facility]     Level of Medical Decision Making:   - At least 1 chronic problem that is not stable  - Engaged in prescription drug management during visit (discussed any medication benefits, side effects, alternatives, etc.)     The longitudinal plan of care for the diagnosis(es)/condition(s) as documented were addressed during this visit. Due to the added complexity in care, I will continue to support Mark in the subsequent management and with ongoing continuity of care.     Patient not  staffed in clinic.  Note will be reviewed and signed by supervisor Dr. Barragan.

## 2024-09-24 NOTE — PATIENT INSTRUCTIONS
Medication Changes:  None Today.     Other:  -Therapy referral placed at previous visit, please call: [phone] to schedule an appointment.   -Labs ordered from previous resident, please have those collected at any [facility] lab.       **For crisis resources, please see the information at the end of this document**   Patient Education    Thank you for coming to the [facility] [place] [facility].     Lab Testing:  If you had lab testing today and your results are reassuring or normal they will be mailed to you or sent through [employer] within 7 days. If the lab tests need quick action we will call you with the results. The phone number we will call with results is # [phone]. If this is not the best number please call our clinic and change the number.     Medication Refills:  If you need any refills please call your pharmacy and they will contact us. Our fax number for refills is [phone].   Three business days of notice are needed for general medication refill requests.   Five business days of notice are needed for controlled substance refill requests.   If you need to change to a different pharmacy, please contact the new pharmacy directly. The new pharmacy will help you get your medications transferred.     Contact Us:  Please call [phone] during business hours (8-5:00 M-F).   If you have medication related questions after clinic hours, or on the weekend, please call [phone].     Financial Assistance [phone]   Medical Records [phone]       MENTAL HEALTH CRISIS RESOURCES:  For a emergency help, please call 911 or go to the nearest Emergency Department.     Emergency Walk-In Options:   EmPATH Unit @ [facility] ([place]): [phone] - Specialized mental health emergency area designed to be calming  [facility] ([place]): [phone]  [facility] ([place]): [phone]  [facility]  [place]): [phone]    [facility] Crisis Information:   [place]: [phone]  [place]: [phone]  [place] ([place]) - Adult: [phone]     Child: [phone]  [place] - Adult: [phone]     Child: [phone]  [place]: [phone]  List of all [facility] resources:   https://[place].[email or web address]/dhs/people-[email or web address]-serve/adults/health-care/mental-health/resources/crisis-contacts.jsp    National Crisis Information:   Crisis Text Line: Text  [place]  to 882361  Suicide & Crisis Lifeline: 988  National Suicide Prevention Lifeline: 5-290-585-TALK (1-236.568.2953)       For online chat options, visit https://suicidepreventionlifeline.org/chat/  Poison Control Center: 1-684.264.6322  Trans Lifeline: 1-738.278.3103 - Hotline for transgender people of all ages  [employer]: 3-357-971-2050 - Hotline for LGBT youth     For Non-Emergency Support:   Fast Tracker: Mental Health & Substance Use Disorder Resources -   https://www.159.comckPhlexglobaln.org/

## 2024-10-29 ENCOUNTER — MYC REFILL (OUTPATIENT)
Dept: PSYCHIATRY | Facility: CLINIC | Age: 34
End: 2024-10-29
Payer: COMMERCIAL

## 2024-10-29 DIAGNOSIS — R45.89 EMOTIONAL DYSREGULATION: ICD-10-CM

## 2024-10-29 RX ORDER — GUANFACINE 1 MG/1
2 TABLET, EXTENDED RELEASE ORAL AT BEDTIME
Qty: 60 TABLET | Refills: 0 | Status: SHIPPED | OUTPATIENT
Start: 2024-10-29 | End: 2024-11-12

## 2024-10-29 NOTE — TELEPHONE ENCOUNTER
Last seen: 9/24  RTC: 6 weeks  Cancel: None  No-show: None  Next appt: 11/12       Medication requested:   Pending Prescriptions:                       Disp   Refills    guanFACINE (INTUNIV) 1 MG TB24 24 hr tabl*30 tab*2            Sig: Take 2 tablets (2 mg) by mouth at bedtime.        From chart note:   1) Medications:   No changes today.   - Continue Intuniv from 1 mg to 2mg at bedtime.  - Continue sertraline 100 mg at bedtime. Can consider increasing at future visits.       Refills sent to RN for final review

## 2024-11-12 ENCOUNTER — VIRTUAL VISIT (OUTPATIENT)
Dept: PSYCHIATRY | Facility: CLINIC | Age: 34
End: 2024-11-12
Attending: STUDENT IN AN ORGANIZED HEALTH CARE EDUCATION/TRAINING PROGRAM
Payer: COMMERCIAL

## 2024-11-12 DIAGNOSIS — R45.89 EMOTIONAL DYSREGULATION: ICD-10-CM

## 2024-11-12 DIAGNOSIS — F12.90 CANNABIS USE DISORDER: ICD-10-CM

## 2024-11-12 DIAGNOSIS — F40.10 SOCIAL ANXIETY DISORDER: ICD-10-CM

## 2024-11-12 DIAGNOSIS — R41.840 ATTENTION OR CONCENTRATION DEFICIT: Primary | ICD-10-CM

## 2024-11-12 PROCEDURE — G2211 COMPLEX E/M VISIT ADD ON: HCPCS | Mod: 95

## 2024-11-12 PROCEDURE — 99214 OFFICE O/P EST MOD 30 MIN: CPT | Mod: 95

## 2024-11-12 RX ORDER — GUANFACINE 3 MG/1
3 TABLET, EXTENDED RELEASE ORAL AT BEDTIME
Qty: 30 TABLET | Refills: 1 | Status: SHIPPED | OUTPATIENT
Start: 2024-11-12 | End: 2024-11-12

## 2024-11-12 RX ORDER — SERTRALINE HYDROCHLORIDE 100 MG/1
100 TABLET, FILM COATED ORAL DAILY
Qty: 30 TABLET | Refills: 2 | Status: SHIPPED | OUTPATIENT
Start: 2024-11-12

## 2024-11-12 RX ORDER — GUANFACINE 3 MG/1
3 TABLET, EXTENDED RELEASE ORAL AT BEDTIME
Qty: 30 TABLET | Refills: 2 | Status: SHIPPED | OUTPATIENT
Start: 2024-11-12

## 2024-11-12 ASSESSMENT — PAIN SCALES - GENERAL: PAINLEVEL_OUTOF10: NO PAIN (0)

## 2024-11-12 NOTE — PROGRESS NOTES
Virtual Visit Details    Type of service:  Video Visit     Originating Location (pt. Location): Home  {PROVIDER LOCATION On-site should be selected for visits conducted from your clinic location or adjoining Health system hospital, academic office, or other nearby Health system building. Off-site should be selected for all other provider locations, including home:339912}  Distant Location (provider location):  On-site  Platform used for Video Visit: [facility]  General Clinic Team  MEDICAL PROGRESS NOTE       CARE TEAM:    PCP- Lior Lan  Therapist- None     Mark is a 34 year old who uses the pronouns he, him, his.               Diagnosis  Social anxiety disorder  R/o unspecified mood disorder (diagnostic history of bipolar disorder)  Emotional dysregulation  R/o ADHD  Cannabis use disorder, unspecified severity, active  Learning disability (diagnosed in youth, with IEP during school)    Assessment & Plan   Mark is a 33 year old with the above medical diagnoses who over the past few years has started to focus on his mental health concerns.Mark previously was working with Dr. Gentile and I met Mark for a transfer of care appointment on 8/13/24. We have been working to address the below problems: (carried forward from Dr. Gentile's previous notes and updated accordingly):      #mood dysregulation  #impulsivity  #fear of abandonment  He has a history of seeing a psychiatrist on an involuntary basis as part of a mandatory program for a few years where he was diagnosed and treated for a diagnosis of Bipolar Disorder. He states the diagnosis never resonated with him. His concerns about mood dysregulation, impulsivity and anger management difficulties were perceived as part of Bipolar disorder.       MSI-BPD was filled out today and Mark scored 7/10 which is suggestive of cluster b traits. He works closely with his therapist and is  recommended to discuss this with her and get her opinions as well.      Since being off of quetiapine he feels more awake and sharp in the morning.  Denies any side effects from the sertraline.     He denies symptoms of kayla. Denied depressed mood and not outwardly depressed at this time. Mark is not treated with a mood stabilizing medication at this time, and is taking only an antidepressant.  Should there be evidence of hypomania, kayla, bipolar disorder, a mood stabilizing medication and consideration of lowering/stopping antidepressant would be beneficial.  He has a history of suicidal thought and behavior/suicide attempt. Today, he denied any suicidal thought and did not present as a current/immediate or recent safety risk to himself or others.      #inattention  #impulsivity  #emotional reactivity  Overlap with symptoms of BPD. He has a hx of learning disability as a kid. He was never formally assessed by a clinician for ADHD. It is likely that the learning disability and inconsistent poor performance in some subjects be due to undiagnosed ADHD. He is scheduled for an assessment with ADHD clinic in Summer 2024 which was cancelled. Today a new referral will be sent. We agreed to consider starting guanfacine with potential to help with nightmares, bedtime anxiety, and potential ADHD symptoms. Recommendation is to see PCP for new vitals. If vitals within normal range guanfacine can be ordered. We discussed how cannabis use can impact the symptoms above and recommendations to decrease use was provided.     #subjective concerns for autism  Describes difficulty with social cues that result in frustration and anxiety that he identifies as autistic behaviors. He is interested in being tested for ASD. Writer will consider sending a referral to [facility].     #anxiety around strangers  #fear of negative judgment  #avoidance of social situations  Anxiety in the social context appears to have developed in teenage years.  CBT would be considered the gold standard treatment. Lexapro started in June 2023 was minimally effective at low dose and no improvement in the symptoms when tapered up to 20 mg. He could not tolerate the 20 mg for longer than a few weeks due to sexual side effects.     Sertraline was increased to 100 mg last visit. He has got very positive results from the sertraline, mainly decreasing anxiety and improving emotional lability. He denies any side effects including dizziness, nausea/vomiting, diarrhea or fatigue. He is interested in staying at the current dose of 100 mg.     #cannabis use - Mark has been using cannabis daily. We discussed that this may contribute to inattention, anxiety and social challenges - smokes cannabis several times daily. He is in the pre-contemplative stage of change.     Today, Mark continues to endorse notable cannabis use- though has reported cutting down significantly on alcohol. He continues to deny using an cannabis or alcohol while working as a . Mark reports that overall things have been going well and the intuniv has helped with anxiety/fight or flight response, though he has been sweating a lot more and having hot flashes. Mark does say he has always had issues with sweating and hot flashes and is not entirely sure if medications worsened it though he will monitor it more closely. We agreed to not make any medication changes today.      Psychotropic Drug Interactions:  [PSYCHCLINICDDI]  none  Management: N/A     MNPMP was not checked today: not using controlled substances     Risk Statements:   Treatment Risk- Risks, benefits, alternatives and potential adverse effects have been discussed and are understood.   Safety Risk-Mark did not appear to be an imminent safety risk to self or others.    PLAN    1) Medications:   No changes today.   - Continue Intuniv from 1 mg to 2mg at bedtime.  - Continue sertraline 100 mg at bedtime. Can consider increasing at  "future visits.      2) Psychotherapy:   Therapy referral placed last visit. Provided number again today to schedule appointment.     3) Next due:  Labs-  CBC, CMP, TSH, lipids and HgbA1c previously ordered, not completed  EKG- not indicated  Rating scales- none needed     4) Referrals:    ADHD testing/referral: Plans to combine with ASD testingat [place]. Discussed that if he doesn't find availability at [place] then to reach out to us.    Therapy referral placed this visit. CBT for Anxiety.     Open to revisiting DBT for later.     5) Other: none     6) Follow-up: Return to clinic in 6 weeks                   Interval History     Interval Events;   -Things going well overall.  life going.   -Work is going well overall, issues with one manager. Unlikely he will lose his job.   -Grandma dying. In the hospital now, has been for quite a while. On dialysis. She may not make it.    -She is up in [place]-they want to transfer her to [place].    -They are looking at a long term care facility, hospice.   -He will be Maurice ramires.   -Talks to wife and family about it.     -Therapist:  Hasn't met with one yet.     -Doesn't notice any new or worsening MH symptoms.     -Has a PCP scheduled with primary.     -Mood: Up and down with election.     -Anxiety: Anxiety up quite a bit with grandma dying.    -Resting HR was at 101.     -Impulsivity, Mood Regulation:  Doesn't notice any difference. Still has tendency and impulse buythings.    -Since last visit, things have felt basically the same.    -\"Still explore, but still able to collect self more\"   -Ok with increasing Guanfacine, 3 mg.    -No lightheadedness. Only when anxiety starts going really bad      -Sii/SIBI:  No.     -In fits of rage, does have a tendency to punch things.     -This usually doesn't happen anymore  -HI: No      Review Medications,   -Taking    -No other medication changes since last visit.     Substances:   -Wait until sober from THC so he can get " ADHD evaluation.    -Once off of this  for a few months.    -Still using, decreased the amount by half. Before doing a lot of dabs (THC concentrates). Stopped doing dabs, now just smoking flower.    -Discussed possibly starting gabapentin to help with withdrawal (said he's had trouble with sleep and eating 2/2 to withdrawal). Said if he can't cut down further then he'll let me know at next visit.   -Alcohol: No longer has regular drinking, at a special functions Doesn't feel like it gets out of control.   -Cigarettes: No  -Vaping Nicotine:  5mg. Will probably put it down again. More of oral fixation.    -Found this thing on the internet, tube with a flavor pod in it-looks like a vape. Fume-vaping flavored air.    -Also discussed lowering concentration.   -Cannabis, Alcohol not interfering with work.   -Caffeine: 2 cups of coffee/day.   -No other substances.         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 Lives in [place].            -Typical Day: Typically will wake up, go to work. Come home, figure out a chore. If time time, then will play video games or watch something on a streaming service. Gets distracted or hyperfocused on things.            -Two cats: Merlin, Jojo (orange boy).            -Emily-12 years together. Getting  in a month. Very stressful. Getting the final details now. Mostly planned, minor details. Excited for the wedding. Getting  at the [employer].            -Plan is to go [facility] the week after. Working on getting all that planned.            -Support: Lives with Emily and 2 other roommates. Parents are a good, strong support. Can rely on them. Has some good friends that he can contact. Cats.            -Things that bring him lora: Play video games, watch anime, watch tv shows, movies, bar scene, going to a restaurant with a group of people, loves eating. Biggest hobby: D&D. Multiple  "sessions throughout the week-stephanie other Monday is his campaign . Alternates between running the session and playing the session. Every other Sunday, has another session. D&D is very therapeutic-helps a lot wth social anxiety. A lot of Neurodivergent people fluctuate to it. Usually go from 6:30 to 10. D&D also helps build a structure.              Pertinent Substance Use:  Carried forward from transfer of care note. Updated 9/24/24  Cannabis:   \"I am a heavy user\". Marijuana has been a cure all for ailments. If nauseous, smokes some flower, little hits. Mastered the art of smoking pot. If can't fall asleep, takes a good hi.t. Helps. When waking up from PTSD nightmare. Allows him to get into the headspace of being able to use coping techniques.   -Ever since the age of 18.   -Amount varies from day to day. Activities. A high stress day-3-5 bowls/day. If a low stress day-1-2 bowls a day. Smokes a lot of weed during D&D.   -Discussed risks with use: attention, sleep.            -Q once in a while, to decrease tolerance. T breaks. Has withdrawal during the first week: Appetite, nausea.            -Thought about long term abstinence. The more it's becoming legal, the more thinking about it less and less. Thoroughly enjoy and has helped in a lot of aspects.            -No smoking and driving. So needs something for the day-high anxiety and why he needs.   Caffeine: Not Later in the day.   Alcohol: Previously reported 1x/week. Varies depending on D&D. 4-5 beers.Does not feel problematic. Today (9/24/24) reported cutting down.     - Hasn't had anything to drink since honeymoon. Felt like he was starting to have less control after drinking 2 drinks. Didn't feel like he need to drink all the time it was more when he started, it was difficult to stop.    -No hx of withdrawal.    -Denying withdrawals, cravings.    - Not interested in MAT or any additional support.    -Motivated for health reasons too. Has been thinking about " this for a while now.   -Drinking or using cannabis while driving: No.    -Nicotine: Very rarely when drinking.   -Other Substances: 2-3x/year, a small amount of mushrooms.Nothing else including stimulants.      Psych and Medical ROS per HPI    Plan:  - Increase Intuniv to 3mg.   -Can consider gabapentin at next visit to help with cannabis cessation.   - Follow Up : 6 weeks on a Monday or Tuesday.                 Summary Points of Current Care  *Carried forward from last psychiatry progress note-will continue to update accordingly*  The following section contains information copied from previous note by Dr Jaimes on 5/8/22 and has been reviewed, edited, and verified by the patient.     Mark first experienced anxiety for most of his childhood, until age 19 did not know his father because parents . Grew up with with IEP for LD with aggression/fights at school, depressive episodes in adolescence with SI and SIB. No hospitalizations, has been inconsistent with medications and MH care until recently.   Describes difficulty with social cues that result in frustration and anxiety that he identifies with mild autism - no psychometric testing.     Pertinent Items Include: suicide attempt , suicidal ideation, SIB , aggression and substance use: cannabis                  Physical Exam  (Vitals Only)    There were no vitals taken for this visit.  Pulse Readings from Last 3 Encounters:   05/08/24 92   02/28/22 114   02/15/22 104     Wt Readings from Last 3 Encounters:   01/18/24 93 kg (205 lb)   02/28/22 93.4 kg (206 lb)   02/09/22 81.6 kg (180 lb)     BP Readings from Last 3 Encounters:   05/08/24 124/86   02/28/22 136/80   02/15/22 (!) 142/88                      Mental Status Exam    Alertness: alert  and oriented  Appearance: well groomed  Behavior/Demeanor: cooperative, pleasant, and calm, with good  eye contact   Speech: normal and regular rate and rhythm  Language: intact and no problems  Psychomotor: no  psychomotor agitation, retardation or abnormal involuntary movements appreciated  Mood: good  Affect: full range and appropriate;Reactive, bright at times, congruent to: mood- yes, content- yes  Thought Process/Associations: linear, logical, goal directed  Thought Content:  Reports none;  Denies suicidal & violent ideation and delusions  Perception:  Reports none;  Denies hallucinations  Insight: fair  Judgment: fair  Cognition: does  appear grossly intact; formal cognitive testing was not done  Gait and Station: N/A (telehealth)                Past Psychotropic Medication Trials    *Carried forward from last psychiatry progress note-will continue to update accordingly*   Medication Max Dose (mg) Dates / Duration Helpful? DC Reason / Adverse Effects?   Celexa  20      Inadequate trial     Seroquel  200      Helps w sleep    Buspar        Sexual side effects    Lexapro  20  2023 - 6 months No   Not effective   Intuniv   current                     Past Medical History     Patient Active Problem List   Diagnosis    Bipolar disorder (H)    Marijuana use, episodic                     Medications     Current Outpatient Medications   Medication Sig Dispense Refill    desonide (DESOWEN) 0.05 % external cream APPLY TWICE DAILY TO LSC, 3 WEEKS ON, 1 WEEK OF. REPEAT AS NEEDED      guanFACINE (INTUNIV) 1 MG TB24 24 hr tablet Take 2 tablets (2 mg) by mouth at bedtime. 60 tablet 0    sertraline (ZOLOFT) 100 MG tablet Take 1 tablet (100 mg) by mouth daily. 30 tablet 2                     Data         12/18/2023     2:36 PM 4/22/2024     1:53 PM 8/13/2024    10:00 AM   PROMIS-10 Total Score w/o Sub Scores   PROMIS TOTAL - SUBSCORES 26 26 25         4/22/2024     1:49 PM 8/13/2024     9:54 AM 9/24/2024    10:38 AM   PHQ-9 SCORE   PHQ-9 Total Score MyChart 10 (Moderate depression) 11 (Moderate depression) 2 (Minimal depression)   PHQ-9 Total Score 10 11 2         9/20/2021    10:20 AM 5/7/2023     1:29 PM 8/13/2024     9:55 AM  "  JUDITH-7 SCORE   Total Score  16 (severe anxiety) 14 (moderate anxiety)   Total Score 21 16 14       Liver/Kidney Function, TSH Metabolic Blood counts   No lab results found.  No lab results found. Recent Labs   Lab Test 04/03/23  1139   CHOL 224*   TRIG 76   *   HDL 40     Recent Labs   Lab Test 04/03/23  1139   A1C 5.2     No lab results found. No lab results found.     PROVIDER:   Gely Dinh MD  PGY-3 Psychiatry  [facility]     Level of Medical Decision Making:   - At least 1 chronic problem that is not stable  - Engaged in prescription drug management during visit (discussed any medication benefits, side effects, alternatives, etc.)     The longitudinal plan of care for the diagnosis(es)/condition(s) as documented were addressed during this visit. Due to the added complexity in care, I will continue to support Mark in the subsequent management and with ongoing continuity of care.     Patient not staffed in clinic.  Note will be reviewed and signed by supervisor Dr. Barragan.  Virtual Visit Details    Type of service:  Video Visit     Originating Location (pt. Location): {video visit patient location:393111::\"Home\"}  {PROVIDER LOCATION On-site should be selected for visits conducted from your clinic location or adjoining [facility] hospital, academic office, or other nearby [facility] building. Off-site should be selected for all other provider locations, including home:[id number]}  Distant Location (provider location):  {virtual location provider:527892}  Platform used for Video Visit: {Virtual Visit Platforms:047183::\"[employer]\"}    "

## 2024-11-12 NOTE — NURSING NOTE
Current patient location: [address] [place] [address] [facility] [address]    Is the patient currently in the state of [place]? YES    Visit mode:VIDEO    If the visit is dropped, the patient can be reconnected by:VIDEO VISIT: Text to cell phone:   Telephone Information:   Mobile [phone]       Will anyone else be joining the visit? NO  (If patient encounters technical issues they should call [phone])    Are changes needed to the allergy or medication list? No    Are refills needed on medications prescribed by this physician? Discuss with provider    Rooming Documentation:  Patient declined to complete questionnaire(s)    Reason for visit: BALDO PINEDA

## 2024-11-12 NOTE — PATIENT INSTRUCTIONS
Medication Changes:   -Increase guanfacine 24hr tablet  from 2mg to 3mg. We will switch the tablets from 2 of the 1mg tablets to just 1 of the 3mg tablets.     Follow Up: 6 weeks    **For crisis resources, please see the information at the end of this document**   Patient Education    Thank you for coming to the [facility] [place] [facility].     Lab Testing:  If you had lab testing today and your results are reassuring or normal they will be mailed to you or sent through [employer] within 7 days. If the lab tests need quick action we will call you with the results. The phone number we will call with results is # [phone]. If this is not the best number please call our clinic and change the number.     Medication Refills:  If you need any refills please call your pharmacy and they will contact us. Our fax number for refills is [phone].   Three business days of notice are needed for general medication refill requests.   Five business days of notice are needed for controlled substance refill requests.   If you need to change to a different pharmacy, please contact the new pharmacy directly. The new pharmacy will help you get your medications transferred.     Contact Us:  Please call [phone] during business hours (8-5:00 M-F).   If you have medication related questions after clinic hours, or on the weekend, please call [phone].     Financial Assistance [phone]   Medical Records [phone]       MENTAL HEALTH CRISIS RESOURCES:  For a emergency help, please call 911 or go to the nearest Emergency Department.     Emergency Walk-In Options:   EmPATH Unit @ [facility] ([place]): [phone] - Specialized mental health emergency area designed to be calming  [facility] ([place]): [phone]  [facility] ([place]): [phone]  [facility]): [phone]    [facility] Crisis Information:   [place]:  [phone]  [place]: [phone]  [place] (COPE) - Adult: [phone]     Child: [phone]  [place] - Adult: [phone]     Child: [phone]  [place]: [phone]  List of all [facility] resources:   https://[place].gov/dhs/people-we-serve/adults/health-care/mental-health/resources/crisis-contacts.jsp    National Crisis Information:   Crisis Text Line: Text  [place]  to 003190  Suicide & Crisis Lifeline: 988  National Suicide Prevention Lifeline: 2-430-791-TALK (1-283.887.2445)       For online chat options, visit https://suicidepreventionlifeline.org/chat/  Poison Control Center: 8-809-422-7398  Trans Lifeline: 1-310.167.9656 - Hotline for transgender people of all ages  The [employer] Project: 0-938-505-3175 - Hotline for LGBT youth     For Non-Emergency Support:   Fast Tracker: Mental Health & Substance Use Disorder Resources -   https://www.[employer].org/

## 2024-12-30 ENCOUNTER — VIRTUAL VISIT (OUTPATIENT)
Dept: PSYCHIATRY | Facility: CLINIC | Age: 34
End: 2024-12-30
Attending: STUDENT IN AN ORGANIZED HEALTH CARE EDUCATION/TRAINING PROGRAM
Payer: COMMERCIAL

## 2024-12-30 DIAGNOSIS — Z53.9 ERRONEOUS ENCOUNTER--DISREGARD: Primary | ICD-10-CM

## 2024-12-30 ASSESSMENT — ANXIETY QUESTIONNAIRES
GAD7 TOTAL SCORE: 7
7. FEELING AFRAID AS IF SOMETHING AWFUL MIGHT HAPPEN: SEVERAL DAYS
1. FEELING NERVOUS, ANXIOUS, OR ON EDGE: NEARLY EVERY DAY
GAD7 TOTAL SCORE: 7
GAD7 TOTAL SCORE: 7
5. BEING SO RESTLESS THAT IT IS HARD TO SIT STILL: NOT AT ALL
2. NOT BEING ABLE TO STOP OR CONTROL WORRYING: SEVERAL DAYS
3. WORRYING TOO MUCH ABOUT DIFFERENT THINGS: NOT AT ALL
IF YOU CHECKED OFF ANY PROBLEMS ON THIS QUESTIONNAIRE, HOW DIFFICULT HAVE THESE PROBLEMS MADE IT FOR YOU TO DO YOUR WORK, TAKE CARE OF THINGS AT HOME, OR GET ALONG WITH OTHER PEOPLE: SOMEWHAT DIFFICULT
7. FEELING AFRAID AS IF SOMETHING AWFUL MIGHT HAPPEN: SEVERAL DAYS
4. TROUBLE RELAXING: SEVERAL DAYS
6. BECOMING EASILY ANNOYED OR IRRITABLE: SEVERAL DAYS
8. IF YOU CHECKED OFF ANY PROBLEMS, HOW DIFFICULT HAVE THESE MADE IT FOR YOU TO DO YOUR WORK, TAKE CARE OF THINGS AT HOME, OR GET ALONG WITH OTHER PEOPLE?: SOMEWHAT DIFFICULT

## 2024-12-30 ASSESSMENT — PATIENT HEALTH QUESTIONNAIRE - PHQ9
SUM OF ALL RESPONSES TO PHQ QUESTIONS 1-9: 7
SUM OF ALL RESPONSES TO PHQ QUESTIONS 1-9: 7
10. IF YOU CHECKED OFF ANY PROBLEMS, HOW DIFFICULT HAVE THESE PROBLEMS MADE IT FOR YOU TO DO YOUR WORK, TAKE CARE OF THINGS AT HOME, OR GET ALONG WITH OTHER PEOPLE: NOT DIFFICULT AT ALL

## 2024-12-30 NOTE — PROGRESS NOTES
"Virtual Visit Details    Type of service:  Video Visit     Originating Location (pt. Location): {video visit patient location:295795::\"Home\"}  {PROVIDER LOCATION On-site should be selected for visits conducted from your clinic location or adjoining [facility] hospital, academic office, or other nearby [facility] building. Off-site should be selected for all other provider locations, including home:930032}  Distant Location (provider location):  {virtual location provider:461489}  Platform used for Video Visit: {Virtual Visit Platforms:885887::\"[employer]\"}    "

## 2024-12-30 NOTE — NURSING NOTE
Current patient location: [address] [place] [address] [facility] [address]    Is the patient currently in the state of [place]? YES    Visit mode:VIDEO    If the visit is dropped, the patient can be reconnected by:VIDEO VISIT: Text to cell phone:   Telephone Information:   Mobile [phone]       Will anyone else be joining the visit? NO  (If patient encounters technical issues they should call [phone] :[phone])    Are changes needed to the allergy or medication list? Pt stated no changes to allergies and Pt stated no med changes    Are refills needed on medications prescribed by this physician? NO    Rooming Documentation:  Questionnaire(s) completed    Reason for visit: BALDO Coleman F

## 2025-01-07 ENCOUNTER — OFFICE VISIT (OUTPATIENT)
Dept: INTERNAL MEDICINE | Facility: CLINIC | Age: 35
End: 2025-01-07
Payer: COMMERCIAL

## 2025-01-07 VITALS
OXYGEN SATURATION: 96 % | WEIGHT: 216.7 LBS | RESPIRATION RATE: 18 BRPM | SYSTOLIC BLOOD PRESSURE: 112 MMHG | DIASTOLIC BLOOD PRESSURE: 74 MMHG | HEART RATE: 73 BPM | HEIGHT: 71 IN | BODY MASS INDEX: 30.34 KG/M2

## 2025-01-07 DIAGNOSIS — H54.7 POOR VISION: ICD-10-CM

## 2025-01-07 DIAGNOSIS — E78.5 HYPERLIPIDEMIA LDL GOAL <100: ICD-10-CM

## 2025-01-07 DIAGNOSIS — Z00.00 ROUTINE GENERAL MEDICAL EXAMINATION AT A HEALTH CARE FACILITY: Primary | ICD-10-CM

## 2025-01-07 DIAGNOSIS — F39 MOOD DISORDER: ICD-10-CM

## 2025-01-07 DIAGNOSIS — R21 RASH: ICD-10-CM

## 2025-01-07 PROCEDURE — 99395 PREV VISIT EST AGE 18-39: CPT | Performed by: INTERNAL MEDICINE

## 2025-01-07 RX ORDER — DESONIDE 0.5 MG/G
CREAM TOPICAL
Qty: 15 G | Refills: 3 | Status: SHIPPED | OUTPATIENT
Start: 2025-01-07

## 2025-01-07 SDOH — HEALTH STABILITY: PHYSICAL HEALTH: ON AVERAGE, HOW MANY DAYS PER WEEK DO YOU ENGAGE IN MODERATE TO STRENUOUS EXERCISE (LIKE A BRISK WALK)?: 3 DAYS

## 2025-01-07 SDOH — HEALTH STABILITY: PHYSICAL HEALTH: ON AVERAGE, HOW MANY MINUTES DO YOU ENGAGE IN EXERCISE AT THIS LEVEL?: 30 MIN

## 2025-01-07 ASSESSMENT — PATIENT HEALTH QUESTIONNAIRE - PHQ9
SUM OF ALL RESPONSES TO PHQ QUESTIONS 1-9: 10
SUM OF ALL RESPONSES TO PHQ QUESTIONS 1-9: 10
10. IF YOU CHECKED OFF ANY PROBLEMS, HOW DIFFICULT HAVE THESE PROBLEMS MADE IT FOR YOU TO DO YOUR WORK, TAKE CARE OF THINGS AT HOME, OR GET ALONG WITH OTHER PEOPLE: NOT DIFFICULT AT ALL

## 2025-01-07 ASSESSMENT — SOCIAL DETERMINANTS OF HEALTH (SDOH): HOW OFTEN DO YOU GET TOGETHER WITH FRIENDS OR RELATIVES?: THREE TIMES A WEEK

## 2025-01-07 NOTE — PROGRESS NOTES
Preventive Care Visit  [facility]  Lior Paredes MD, Internal Medicine  Jan 7, 2025    Assessment & Plan   Routine general medical examination at a health care facility  Reviewed PMH. Discussed healthcare maintenance issues, including cancer screenings (not due), relevant immunizations (declined today), and cardiac risk factor screenings such as for cholesterol, HTN, and DM (patient deferred today but plans to come back in the next 1-4 weeks via lab-only appointment). Discussed role of diet in health and encouraged patient to eat more fresh foods + less processed foods. Discussed role of exercise in health and encourage patient to keeping moving their body regularly. Also placed eye exam referral per request, and discussed [facility] for dental care.    Hyperlipidemia LDL goal <100  Future lab orders placed. He will obtain in coming weeks.  - CBC with Platelets & Differential; Future  - Comprehensive metabolic panel; Future  - Lipid panel reflex to direct LDL Non-fasting; Future    Rash  Refilled chronic medication at current dose.  - desonide (DESOWEN) 0.05 % external cream; APPLY TWICE DAILY TO LSC, 3 WEEKS ON, 1 WEEK OF. REPEAT AS NEEDED    Mood disorder (H)  Known issue that I take into account for their medical decisions, no current exacerbations or new concerns. Defer to his psychiatry team.    Depression Screening Follow Up      1/7/2025     1:45 PM   PHQ   PHQ-9 Total Score 10    Q9: Thoughts of better off dead/self-harm past 2 weeks Not at all       Patient-reported         1/7/2025     1:45 PM   Last PHQ-9   1.  Little interest or pleasure in doing things 1   2.  Feeling down, depressed, or hopeless 1   3.  Trouble falling or staying asleep, or sleeping too much 2   4.  Feeling tired or having little energy 1   5.  Poor appetite or overeating 1   6.  Feeling bad about yourself 1   7.  Trouble concentrating 3   8.  Moving slowly or restless 0   Q9: Thoughts of better off  dead/self-harm past 2 weeks 0   PHQ-9 Total Score 10        Patient-reported     Follow Up Actions Taken  Referred patient back to current mental health provider.     Counseling  Appropriate preventive services were addressed with this patient via screening, questionnaire, or discussion as appropriate for fall prevention, nutrition, physical activity, Tobacco-use cessation, social engagement, weight loss and cognition. Checklist reviewing preventive services available has been given to the patient. Reviewed patient's diet, addressing concerns and/or questions. He is at risk for lack of exercise and has been provided with information to increase physical activity for the benefit of his well-being.   The patient was instructed to see the dentist every 6 months. He is at risk for psychosocial distress and has been provided with information to reduce risk. The patient reports drinking more than 3 alcoholic drinks per day and/or more than 7 drinks per week. The patient was counseled and given information about possible harmful effects of excessive alcohol intake. The patient's PHQ-9 score is consistent with moderate depression. He was provided with information regarding depression.     Signed Electronically by:  Lior Paredes MD, MPH  [facility]  Internal Medicine    Subjective   Mark is a 34 year old presenting for the following: Physical    HPI  Mark presents today for a physical exam. We also discussed his chronic health conditions. Requesting refill of desonide.    Health Care Directive Patient does not have a Health Care Directive        1/7/2025   General Health   How would you rate your overall physical health? (!) FAIR   Feel stress (tense, anxious, or unable to sleep) Very much         1/7/2025   Nutrition   Three or more servings of calcium each day? (!) I DON'T KNOW   Diet: Regular (no restrictions)   How many servings of fruit and vegetables per day? (!) I DON'T KNOW    How many sweetened beverages each day? 0-1         1/7/2025   Exercise   Days per week of moderate/strenous exercise 3 days   Average minutes spent exercising at this level 30 min         1/7/2025   Social Factors   Frequency of gathering with friends or relatives Three times a week         1/7/2025   Dental   Dentist two times every year? (!) NO     Today's PHQ-9 Score:       1/7/2025     1:45 PM   PHQ-9 SCORE   PHQ-9 Total Score MyChart 10 (Moderate depression)   PHQ-9 Total Score 10        Patient-reported         1/7/2025   Substance Use   Alcohol more than 3/day or more than 7/wk Yes   How often do you have a drink containing alcohol 2 to 3 times a week   How many alcohol drinks on typical day 5 or 6   How often do you have 5+ drinks at one occasion Weekly   Audit 2/3 Score 5   How often not able to stop drinking once started Weekly   How often failed to do what normally expected Never   How often needed first drink in am after a heavy drinking session Never   How often feeling of guilt or remorse after drinking Weekly   How often unable to remember what happened the night before Less than monthly   Have you or someone else been injured because of your drinking Yes, but not in the last year   Has anyone been concerned or suggested you cut down on drinking Yes, but not in the last year   TOTAL SCORE - AUDIT 19   Do you use any other substances recreationally? (!) CANNABIS PRODUCTS     Social History     Tobacco Use    Smoking status: Former     Current packs/day: 0.50     Types: Cigarettes     Passive exposure: Never    Smokeless tobacco: Never   Vaping Use    Vaping status: Every Day    Substances: THC   Substance Use Topics    Alcohol use: Yes    Drug use: Yes     Types: Marijuana         1/7/2025   Contraception/Family Planning   Questions about contraception or family planning No     Reviewed and updated as needed this visit by Provider   Tobacco  Allergies  Meds  Problems  Med Hx  Surg Hx  Fam Hx    "          Objective    Exam  /74   Pulse 73   Resp 18   Ht 1.803 m (5' 11\")   Wt 98.3 kg (216 lb 11.2 oz)   SpO2 96%   BMI 30.22 kg/m     Estimated body mass index is 30.22 kg/m  as calculated from the following:    Height as of this encounter: 1.803 m (5' 11\").    Weight as of this encounter: 98.3 kg (216 lb 11.2 oz).    Physical Exam  GENERAL: In no distress.  EYES: Conjunctivae/corneas clear. EOMs grossly intact.  HENT: NC/AT, facies symmetric.  RESP: CTAB. No w/r/r.  CV: RRR, no m/r/g.  GI: NT, ND, without rebound or guarding, no CVA tenderness, no hepatomegaly appreciated.  MSK: Moves all four extremities freely.  SKIN: No significant ulcers, lesions or rashes on the visualized portions of the skin  NEURO: Alert. Oriented.  PSYCH: Linear thought process. Speech normal rate and volume. No tangential thoughts, hallucinations, or delusions.  "

## 2025-01-07 NOTE — PATIENT INSTRUCTIONS
- I have placed the order for blood tests and you can make a lab-only appointment via Drifty or by calling into our clinic. You do not need to see me again to have this blood work done as I have already placed the order. I will send you a message on Drifty when I am able to look at the results of those tests.    - Stop by our pharmacy downstairs or your preferred pharmacy to discuss obtaining a tetanus booster shot

## 2025-01-21 ENCOUNTER — LAB (OUTPATIENT)
Dept: LAB | Facility: CLINIC | Age: 35
End: 2025-01-21
Attending: INTERNAL MEDICINE
Payer: COMMERCIAL

## 2025-01-21 DIAGNOSIS — E78.5 HYPERLIPIDEMIA LDL GOAL <100: ICD-10-CM

## 2025-01-21 LAB
BASOPHILS # BLD AUTO: 0.1 10E3/UL (ref 0–0.2)
BASOPHILS NFR BLD AUTO: 1 %
EOSINOPHIL # BLD AUTO: 0.2 10E3/UL (ref 0–0.7)
EOSINOPHIL NFR BLD AUTO: 3 %
ERYTHROCYTE [DISTWIDTH] IN BLOOD BY AUTOMATED COUNT: 11.7 % (ref 10–15)
HCT VFR BLD AUTO: 42.5 % (ref 40–53)
HGB BLD-MCNC: 14.6 G/DL (ref 13.3–17.7)
IMM GRANULOCYTES # BLD: 0 10E3/UL
IMM GRANULOCYTES NFR BLD: 0 %
LYMPHOCYTES # BLD AUTO: 1.8 10E3/UL (ref 0.8–5.3)
LYMPHOCYTES NFR BLD AUTO: 29 %
MCH RBC QN AUTO: 29.9 PG (ref 26.5–33)
MCHC RBC AUTO-ENTMCNC: 34.4 G/DL (ref 31.5–36.5)
MCV RBC AUTO: 87 FL (ref 78–100)
MONOCYTES # BLD AUTO: 0.7 10E3/UL (ref 0–1.3)
MONOCYTES NFR BLD AUTO: 11 %
NEUTROPHILS # BLD AUTO: 3.6 10E3/UL (ref 1.6–8.3)
NEUTROPHILS NFR BLD AUTO: 56 %
PLATELET # BLD AUTO: 256 10E3/UL (ref 150–450)
RBC # BLD AUTO: 4.88 10E6/UL (ref 4.4–5.9)
WBC # BLD AUTO: 6.4 10E3/UL (ref 4–11)

## 2025-01-21 PROCEDURE — 80053 COMPREHEN METABOLIC PANEL: CPT

## 2025-01-21 PROCEDURE — 80061 LIPID PANEL: CPT

## 2025-01-21 PROCEDURE — 85025 COMPLETE CBC W/AUTO DIFF WBC: CPT

## 2025-01-21 PROCEDURE — 36415 COLL VENOUS BLD VENIPUNCTURE: CPT

## 2025-01-22 LAB
ALBUMIN SERPL BCG-MCNC: 4.4 G/DL (ref 3.5–5.2)
ALP SERPL-CCNC: 78 U/L (ref 40–150)
ALT SERPL W P-5'-P-CCNC: 23 U/L (ref 0–70)
ANION GAP SERPL CALCULATED.3IONS-SCNC: 10 MMOL/L (ref 7–15)
AST SERPL W P-5'-P-CCNC: 22 U/L (ref 0–45)
BILIRUB SERPL-MCNC: 0.3 MG/DL
BUN SERPL-MCNC: 12 MG/DL (ref 6–20)
CALCIUM SERPL-MCNC: 9.7 MG/DL (ref 8.8–10.4)
CHLORIDE SERPL-SCNC: 105 MMOL/L (ref 98–107)
CHOLEST SERPL-MCNC: 245 MG/DL
CREAT SERPL-MCNC: 0.93 MG/DL (ref 0.67–1.17)
EGFRCR SERPLBLD CKD-EPI 2021: >90 ML/MIN/1.73M2
FASTING STATUS PATIENT QL REPORTED: NO
FASTING STATUS PATIENT QL REPORTED: NO
GLUCOSE SERPL-MCNC: 97 MG/DL (ref 70–99)
HCO3 SERPL-SCNC: 26 MMOL/L (ref 22–29)
HDLC SERPL-MCNC: 31 MG/DL
LDLC SERPL CALC-MCNC: 176 MG/DL
NONHDLC SERPL-MCNC: 214 MG/DL
POTASSIUM SERPL-SCNC: 4.5 MMOL/L (ref 3.4–5.3)
PROT SERPL-MCNC: 7.1 G/DL (ref 6.4–8.3)
SODIUM SERPL-SCNC: 141 MMOL/L (ref 135–145)
TRIGL SERPL-MCNC: 188 MG/DL

## 2025-02-18 ENCOUNTER — VIRTUAL VISIT (OUTPATIENT)
Dept: PSYCHIATRY | Facility: CLINIC | Age: 35
End: 2025-02-18
Attending: STUDENT IN AN ORGANIZED HEALTH CARE EDUCATION/TRAINING PROGRAM
Payer: COMMERCIAL

## 2025-02-18 VITALS — BODY MASS INDEX: 28.71 KG/M2 | WEIGHT: 212 LBS | HEIGHT: 72 IN

## 2025-02-18 DIAGNOSIS — R41.840 ATTENTION OR CONCENTRATION DEFICIT: ICD-10-CM

## 2025-02-18 DIAGNOSIS — F12.90 CANNABIS USE DISORDER: ICD-10-CM

## 2025-02-18 DIAGNOSIS — F40.10 SOCIAL ANXIETY DISORDER: Primary | ICD-10-CM

## 2025-02-18 DIAGNOSIS — R45.89 EMOTIONAL DYSREGULATION: ICD-10-CM

## 2025-02-18 DIAGNOSIS — F41.9 ANXIETY DISORDER, UNSPECIFIED TYPE: ICD-10-CM

## 2025-02-18 DIAGNOSIS — R06.83 SNORING: ICD-10-CM

## 2025-02-18 DIAGNOSIS — Z79.899 ENCOUNTER FOR LONG-TERM (CURRENT) USE OF MEDICATIONS: ICD-10-CM

## 2025-02-18 PROCEDURE — G2211 COMPLEX E/M VISIT ADD ON: HCPCS | Mod: 95

## 2025-02-18 PROCEDURE — 1126F AMNT PAIN NOTED NONE PRSNT: CPT | Mod: 95

## 2025-02-18 PROCEDURE — 98006 SYNCH AUDIO-VIDEO EST MOD 30: CPT | Mod: GC

## 2025-02-18 RX ORDER — GUANFACINE 3 MG/1
3 TABLET, EXTENDED RELEASE ORAL AT BEDTIME
Qty: 30 TABLET | Refills: 2 | Status: SHIPPED | OUTPATIENT
Start: 2025-02-18

## 2025-02-18 ASSESSMENT — PATIENT HEALTH QUESTIONNAIRE - PHQ9
SUM OF ALL RESPONSES TO PHQ QUESTIONS 1-9: 9
10. IF YOU CHECKED OFF ANY PROBLEMS, HOW DIFFICULT HAVE THESE PROBLEMS MADE IT FOR YOU TO DO YOUR WORK, TAKE CARE OF THINGS AT HOME, OR GET ALONG WITH OTHER PEOPLE: SOMEWHAT DIFFICULT
SUM OF ALL RESPONSES TO PHQ QUESTIONS 1-9: 9

## 2025-02-18 ASSESSMENT — PAIN SCALES - GENERAL: PAINLEVEL_OUTOF10: NO PAIN (0)

## 2025-02-18 NOTE — NURSING NOTE
Current patient location: [address]  [facility] [address]    Is the patient currently in the state of [place]? YES    Visit mode: VIDEO    If the visit is dropped, the patient can be reconnected by:VIDEO VISIT: Text to cell phone:   Telephone Information:   Mobile [phone]       Will anyone else be joining the visit? NO  (If patient encounters technical issues they should call [phone])    Are changes needed to the allergy or medication list? No    Are refills needed on medications prescribed by this physician? Requesting refill on  guanFACINE HCl (INTUNIV) 3 MG TB24 24 hr tablet     Rooming Documentation:  Questionnaire(s) completed    Reason for visit: RECHECK    Miya VILLAGRANF

## 2025-02-18 NOTE — PROGRESS NOTES
"Virtual Visit Details    Type of service:  Video Visit     Originating Location (pt. Location): Home    Distant Location (provider location):  On-site  Platform used for Video Visit: [facility]  General Clinic Team  MEDICAL PROGRESS NOTE       CARE TEAM:    PCP- Lior Lan  Therapist- None     Mark is a 34 year old who uses the pronouns he, him, his.               Diagnosis  Social anxiety disorder  Emotional dysregulation  Attention or concentration deficit  -R/o ADHD  Cannabis use disorder, unspecified severity, active  R/O unspecified mood disorder (diagnostic history of bipolar disorder)    Historical Diagnosis:   Learning disability (diagnosed in youth, with IEP during school)    Assessment & Plan   Mark is a 34 year old with the above diagnoses who has also expressed concerns for ADHD and ASD. Please see transfer of care note on 8/13 /24 for more details.We recently have been working on addressing anxiety, and mood dysregulation.      Today, Mark endorsed doing ok overall though has had a notable increase in anxiety in the context of work stress. We agreed to increase his Sertraline. Mark has also endorsed some concern with \"passing out\" when giving labs since being on the guanfacine and is not sure if he's noticed much benefit with the medication. He is interested in eventually cutting down though he agrees that with ongoing higher anxiety it may be better to hold off for the time being. We will revisit this at future appointments. Mark also reported snoring at night and some daytime fatigue. We will place a sleep medicine referral to evaluate for sleep apnea.     Cannabis use is ongoing and alcohol use is ongoing though Mark has been working to cut down. Mark has also worked to cut down on Nicotine. Mark has previously reported that alcohol and cannabis have not interfered with his work. "     Mark does not appear to be at imminent risk of harm to himself or anyone else this visit.      Psychotropic Drug Interactions:  [PSYCHCLINICDDI]  none  Management: N/A     [place] was not [place] today: not using controlled substances     Risk Statements:   Treatment Risk- Risks, benefits, alternatives and potential adverse effects have been discussed and are understood.   Safety Risk-Mark did not appear to be an imminent safety risk to self or others.    PLAN    1) Medications:   - Increase sertraline from 100 mg to 150mg at bedtime.   - Continue Intuniv 3mg at bedtime.     2) Psychotherapy:   Therapy referral placed summer 2024 (CBT). Continue to recommend follow up    3) Next due:  Labs-  CBC, CMP, TSH, lipids and HgbA1c previously ordered, not completed  EKG- not indicated  Rating scales- none needed     4) Referrals:    ADHD testing/referral: Plans to combine with ASD testingat [facility]. Discussed that if he doesn't find availability at [facility] then to reach out to us.    Open to revisiting DBT for later.  Sleep medicine referral      5) Other: none     6) Follow-up: Return to clinic in 6 weeks.    Future Considerations:   -Can consider gabapentin to help with cannabis cessation.                  Interval History   Interval Events;   -Ok.   -Work has been main stressor overall: a lot of unnecessary drama. One manager doesn't like him. He filed a formal complaint.    -Will have a sit down meeting tomorrow at work. Not sure how much it will resolve.   -Grandma passed away just before Evaristo.   -Otherwise things have been pretty good with family and wife.     Mental Health:  -A lot of stress with work situation.   -Anxiety pretty high. It's coming back to the point with heart beating out of chest. This is how it was like before he was on the Sertraline. Attributes to work. Eventually may want to go back .    -Feeling on edge and that if someone starts something with him then he will go off. Normally has a  passive mentality.   -Doesn't notice any new or worsening MH symptoms.   -Therapist:  Hasn't met with anyone yet.   -Impulsivity, Mood Regulation:  Can't see if there has been any change. About the same. Impulse there with spending,, not the worst. Buying things he can afford but probably should be saving. Impulse to buy things when something breaks.    -Denies any noticeable change with the guanfacine.   -Sleep: per watch, getting good to excellent sleep.    -Snoring at night and some fatigue during the day. Thinks he may have sleep apnea.   -Si/SIBI:  No.   -HI: No  -Reviewed Medications and side effects.   -Adherent.    -No other medication changes since last visit.   -Guanfacine: fainting with giving blood for labs.     -Scared to give blood now. Even after  eating.     -Open to keeping this for now as we adjust the sertraline but down the road will want to try to lower the guanfacine and see how that goes.   -Has been taking the Sertraline. Discussed increasing to 150mg.       Current Social History:    Social:           - for [employer] in [place]. Lives in [place].           -Two cats: Merlin, Jojo (orange boy).            -Wife-12 years together. Recently .            -Support: Lives with wife and 2 other roommates. Parents are a good, strong support. Can rely on them. Has some good friends that he can contact. Cats.            -Things that bring him lora: Play video games, watch anime, watch tv shows, movies, bar scene, going to a restaurant with a group of people, loves eating. Biggest hobby: D&D. Multiple sessions throughout the week-stephanie other Monday is his campaign . Alternates between running the session and playing the session. Every other Sunday, has another session. D&D is very therapeutic-helps a lot wth social anxiety. A lot of Neurodivergent people fluctuate to it. Usually go from 6:30 to 10. D&D also helps build a structure.              Pertinent Substance Use:  "  Updated this visit.    Cannabis: Back to every day use. Doesn't want to go through process of stopping with irritability with everything going on.   Nicotine: \"Dependency is gone\". Will try a puff or two when playing D&D. When drinking- craves smoking (oral fixation).   Stopped smoking regularly a month ago. Weaned off with patches and pouches. Watching grandma die with lung, kidney problems,   Alcohol: \"Here and there\". Motivation to not drink: Health, doesn't want to be dependent on alcohol. Wants to lose weight. Lately 1-2x/week. Last week drank twice.  Limits self to beer and can stop by self by getting too full. Drinks IPA-usually drink a 4 pack. If still awake then may drink other things but usually is tired and ready for bed.   -Has been through treatment twice before in life.  -No other substances.     -Has previously reported that alcohol and cannabis have not interfered with work.      Psych and Medical ROS per HPI              Summary Points of Current Care  *Carried forward from last psychiatry progress note-will continue to update accordingly*  The following section contains information copied from previous note by Dr Jaimes on 5/8/22 and has been reviewed, edited, and verified by the patient.     Mark first experienced anxiety for most of his childhood, until age 19 did not know his father because parents . Grew up with with IEP for LD with aggression/fights at school, depressive episodes in adolescence with SI and SIB. No hospitalizations, has been inconsistent with medications and MH care until recently.   Describes difficulty with social cues that result in frustration and anxiety that he identifies with mild autism - no psychometric testing.     Pertinent Items Include: suicide attempt , suicidal ideation, SIB , aggression and substance use: cannabis                  Physical Exam  (Vitals Only)    Ht 1.822 m (5' 11.75\")   Wt 96.2 kg (212 lb)   BMI 28.95 kg/m    Pulse Readings from " Last 3 Encounters:   01/07/25 73   05/08/24 92   02/28/22 114     Wt Readings from Last 3 Encounters:   02/18/25 96.2 kg (212 lb)   01/07/25 98.3 kg (216 lb 11.2 oz)   01/18/24 93 kg (205 lb)     BP Readings from Last 3 Encounters:   01/07/25 112/74   05/08/24 124/86   02/28/22 136/80                      Mental Status Exam    Alertness: alert  and oriented  Appearance: well groomed  Behavior/Demeanor: cooperative, pleasant, and calm, with good  eye contact   Speech: normal and regular rate and rhythm  Language: intact and no problems  Psychomotor: no psychomotor agitation, retardation or abnormal involuntary movements appreciated  Mood: ok  Affect: full range and appropriate;Reactive, bright at times, congruent to: mood- yes, content- yes  Thought Process/Associations: linear, logical, goal directed  Thought Content:  Reports none;  Denies suicidal & violent ideation and delusions  Perception:  Reports none;  Denies hallucinations  Insight: fair  Judgment: fair  Cognition: does  appear grossly intact; formal cognitive testing was not done  Gait and Station: N/A (telehealth)                Past Psychotropic Medication Trials    *Carried forward from last psychiatry progress note-will continue to update accordingly*   Medication Max Dose (mg) Dates / Duration Helpful? DC Reason / Adverse Effects?   Celexa  20      Inadequate trial     Seroquel  200      Helps w sleep    Buspar        Sexual side effects    Lexapro  20  2023 - 6 months No   Not effective   Intuniv   current                     Past Medical History     Patient Active Problem List   Diagnosis    Mood disorder    Marijuana use, episodic    Hyperlipidemia LDL goal <100                     Medications     Current Outpatient Medications   Medication Sig Dispense Refill    desonide (DESOWEN) 0.05 % external cream APPLY TWICE DAILY TO LSC, 3 WEEKS ON, 1 WEEK OF. REPEAT AS NEEDED 15 g 3    guanFACINE HCl (INTUNIV) 3 MG TB24 24 hr tablet Take 1 tablet (3 mg) by  mouth at bedtime. 30 tablet 2    sertraline (ZOLOFT) 100 MG tablet Take 1 tablet (100 mg) by mouth daily. 30 tablet 2                     Data         4/22/2024     1:53 PM 8/13/2024    10:00 AM 12/30/2024     2:05 PM   PROMIS-10 Total Score w/o Sub Scores   PROMIS TOTAL - SUBSCORES 26 25 24        Proxy-reported         12/30/2024     2:01 PM 1/7/2025     1:45 PM 2/18/2025     8:41 AM   PHQ-9 SCORE   PHQ-9 Total Score MyChart 7 (Mild depression) 10 (Moderate depression) 9 (Mild depression)   PHQ-9 Total Score 7  10  9        Patient-reported    Proxy-reported         5/7/2023     1:29 PM 8/13/2024     9:55 AM 12/30/2024     2:02 PM   JUDITH-7 SCORE   Total Score 16 (severe anxiety) 14 (moderate anxiety) 7 (mild anxiety)   Total Score 16 14 7        Proxy-reported       Liver/Kidney Function, TSH Metabolic Blood counts   Recent Labs   Lab Test 01/21/25  1335   AST 22   ALT 23   ALKPHOS 78   CR 0.93     No lab results found. Recent Labs   Lab Test 01/21/25  1335   CHOL 245*   TRIG 188*   *   HDL 31*     Recent Labs   Lab Test 04/03/23  1139   A1C 5.2     Recent Labs   Lab Test 01/21/25  1335   GLC 97    Recent Labs   Lab Test 01/21/25  1335   WBC 6.4   HGB 14.6   HCT 42.5   MCV 87           PROVIDER:   Gely Dinh MD  PGY-3 Psychiatry  [facility]     Level of Medical Decision Making:   - At least 1 chronic problem that is not stable  - Engaged in prescription drug management during visit (discussed any medication benefits, side effects, alternatives, etc.)     The longitudinal plan of care for the diagnosis(es)/condition(s) as documented were addressed during this visit. Due to the added complexity in care, I will continue to support Mark in the subsequent management and with ongoing continuity of care.     Patient staffed in clinic with Dr. Norris who will sign the note.  Supervisor is Dr. Barragan.

## 2025-02-18 NOTE — PATIENT INSTRUCTIONS
Medication Changes:  -Increase Sertraline from 100mg to 150mg daily. I changed the tablet dosage from 100mg tablets to 50mg tablets. So you should take 3 of the 50mg tablets daily.       Other: I placed a referral to sleep medicine for evaluation of sleep apnea.     Follow Up: 6 weeks.     **For crisis resources, please see the information at the end of this document**   Patient Education    Thank you for coming to the [facility] [place] [facility].     Lab Testing:  If you had lab testing today and your results are reassuring or normal they will be mailed to you or sent through iC[facility] within 7 days. If the lab tests need quick action we will call you with the results. The phone number we will call with results is # [phone]. If this is not the best number please call our clinic and change the number.     Medication Refills:  If you need any refills please call your pharmacy and they will contact us. Our fax number for refills is [phone].   Three business days of notice are needed for general medication refill requests.   Five business days of notice are needed for controlled substance refill requests.   If you need to change to a different pharmacy, please contact the new pharmacy directly. The new pharmacy will help you get your medications transferred.     Contact Us:  Please call [phone] during business hours (8-5:00 M-F).   If you have medication related questions after clinic hours, or on the weekend, please call [phone].     Financial Assistance [phone]   Medical Records [phone]       MENTAL HEALTH CRISIS RESOURCES:  For a emergency help, please call 911 or go to the nearest Emergency Department.     Emergency Walk-In Options:   EmPATH Unit @ [facility] ([place]): [phone] - Specialized mental health emergency area designed to be calming  [facility] ([place]): [phone]  [facility]  ([place]): [phone]  [facility] ([place]): [phone]    [facility] Crisis Information:   [place]: [phone]  [place]: [phone]  [place] ([place]) - Adult: [phone]     Child: [phone]  [place] - Adult: [phone]     Child: [phone]  [place]: [phone]  List of all [facility] resources:   https://[place].[facility]/dhs/people-we-serve/adults/health-care/mental-health/resources/crisis-contacts.jsp    National Crisis Information:   Crisis Text Line: Text  [place]  to 440877  Suicide & Crisis Lifeline: 988  National Suicide Prevention Lifeline: 0-801-268-TALK (1-228.742.1669)       For online chat options, visit https://suicidepreventionlifeline.org/chat/  Poison Control Center: 1-627.820.2558  Trans Lifeline: 1-858.112.4887 - Hotline for transgender people of all ages  The [employer]: 8-753-925-1567 - Hotline for LGBT youth     For Non-Emergency Support:   Fast Tracker: Mental Health & Substance Use Disorder Resources -   https://www.[employer].org/

## 2025-04-01 ASSESSMENT — ANXIETY QUESTIONNAIRES
8. IF YOU CHECKED OFF ANY PROBLEMS, HOW DIFFICULT HAVE THESE MADE IT FOR YOU TO DO YOUR WORK, TAKE CARE OF THINGS AT HOME, OR GET ALONG WITH OTHER PEOPLE?: SOMEWHAT DIFFICULT
7. FEELING AFRAID AS IF SOMETHING AWFUL MIGHT HAPPEN: SEVERAL DAYS
4. TROUBLE RELAXING: SEVERAL DAYS
2. NOT BEING ABLE TO STOP OR CONTROL WORRYING: SEVERAL DAYS
7. FEELING AFRAID AS IF SOMETHING AWFUL MIGHT HAPPEN: SEVERAL DAYS
IF YOU CHECKED OFF ANY PROBLEMS ON THIS QUESTIONNAIRE, HOW DIFFICULT HAVE THESE PROBLEMS MADE IT FOR YOU TO DO YOUR WORK, TAKE CARE OF THINGS AT HOME, OR GET ALONG WITH OTHER PEOPLE: SOMEWHAT DIFFICULT
GAD7 TOTAL SCORE: 11
1. FEELING NERVOUS, ANXIOUS, OR ON EDGE: NEARLY EVERY DAY
GAD7 TOTAL SCORE: 11
6. BECOMING EASILY ANNOYED OR IRRITABLE: NEARLY EVERY DAY
3. WORRYING TOO MUCH ABOUT DIFFERENT THINGS: SEVERAL DAYS
5. BEING SO RESTLESS THAT IT IS HARD TO SIT STILL: SEVERAL DAYS
GAD7 TOTAL SCORE: 11

## 2025-04-29 ENCOUNTER — VIRTUAL VISIT (OUTPATIENT)
Dept: PSYCHIATRY | Facility: CLINIC | Age: 35
End: 2025-04-29
Attending: STUDENT IN AN ORGANIZED HEALTH CARE EDUCATION/TRAINING PROGRAM
Payer: COMMERCIAL

## 2025-04-29 VITALS — WEIGHT: 213 LBS | BODY MASS INDEX: 29.82 KG/M2 | HEIGHT: 71 IN

## 2025-04-29 DIAGNOSIS — F40.10 SOCIAL ANXIETY DISORDER: ICD-10-CM

## 2025-04-29 DIAGNOSIS — F12.90 CANNABIS USE DISORDER: ICD-10-CM

## 2025-04-29 DIAGNOSIS — F41.9 ANXIETY DISORDER, UNSPECIFIED TYPE: ICD-10-CM

## 2025-04-29 DIAGNOSIS — R45.89 EMOTIONAL DYSREGULATION: ICD-10-CM

## 2025-04-29 DIAGNOSIS — F17.200 NICOTINE USE DISORDER: Primary | ICD-10-CM

## 2025-04-29 DIAGNOSIS — R41.840 ATTENTION OR CONCENTRATION DEFICIT: ICD-10-CM

## 2025-04-29 DIAGNOSIS — R06.83 SNORES: ICD-10-CM

## 2025-04-29 RX ORDER — GUANFACINE 3 MG/1
3 TABLET, EXTENDED RELEASE ORAL AT BEDTIME
Qty: 30 TABLET | Refills: 2 | Status: SHIPPED | OUTPATIENT
Start: 2025-04-29

## 2025-04-29 RX ORDER — NICOTINE 21 MG/24HR
1 PATCH, TRANSDERMAL 24 HOURS TRANSDERMAL EVERY 24 HOURS
Qty: 30 PATCH | Refills: 2 | Status: SHIPPED | OUTPATIENT
Start: 2025-04-29

## 2025-04-29 NOTE — PROGRESS NOTES
Virtual Visit Details    Type of service:  Video Visit     Originating Location (pt. Location): Home    Distant Location (provider location):  On-site  Platform used for Video Visit: [facility]  General Clinic Team  MEDICAL PROGRESS NOTE       CARE TEAM:    PCP- Lior Lan  Therapist- None     Mark is a 34 year old who uses the pronouns he, him, his.               Diagnosis  Social anxiety disorder  Emotional dysregulation  Attention or concentration deficit  -R/o ADHD  Cannabis use disorder, unspecified severity, active  R/O unspecified mood disorder (diagnostic history of bipolar disorder)    Historical Diagnosis:   Learning disability (diagnosed in youth, with IEP during school)    Assessment & Plan   Mark is a 34 year old with the above diagnoses. Please see transfer of care note on 8/13 /24 for more details.We recently have been working on addressing anxiety, and mood dysregulation. Recently we increased his Sertraline due to elevated anxiety, which seemed to be strongly correlated to his work stress.      Today, Mark endorsed doing ok overall. Has been tolerating the increased Sertraline. Work stress has improved though endorsed ongoing but manageable anxiety with current events. He has cut down on cannabis though said that he will likely return to use at some point. We have discussed the risks with cannabis, particularly the associated attention and concentration deficits given that has also been a concern for Mark. Mark expressed interested in wanting to permanently stop vaping (nicotine). He has tried a patch and said that was helpful and was ok with me prescribing it. We also discussed the possibility of starting Wellbutrin later to help with nicotine cessation and this may also be an augmentor for mood and potentially address underlying attention, concentration deficits though we discussed the risk  for worsening anxiety and possibly kayla if there is an underlying bipolar disorder (he was previously diagnosed with this by another provider). Though upon further conversation today, I am less concerned for a bipolar history,. Mark's symptoms of impulsivity, historically seem to be more consistent with mood dysregulation. However, further investigation/close monitoring may be warranted if starting Wellbutrin given this previous diagnosis. Mark has also cut down on his alcohol use notably. Other than starting the patch, we will not make any changes today. Of note, Mark  said that his snoring has improved though I do think this is something that should be followed up on to rule out FAM-I will ask about this more at upcoming visits. Sleep medicine referral previously placed.     Mark does not appear to be at imminent risk of harm to himself or anyone else this visit.      Psychotropic Drug Interactions:  [PSYCHCLINICDDI]  none  Management: N/A     [place] was not checked today: not using controlled substances     Risk Statements:   Treatment Risk- Risks, benefits, alternatives and potential adverse effects have been discussed and are understood.   Safety Risk-Mark did not appear to be an imminent safety risk to self or others.    PLAN    1) Medications:   -Start Nicotine patch 21mg. Advised not to use other patch at home if using this one.   - Continue sertraline 150mg at bedtime.   - Continue Intuniv 3mg at bedtime.     2) Psychotherapy:   Therapy referral placed summer 2024 (CBT). Continue to recommend follow up    3) Next due:  Labs- routine lab monitoring not indicated.   EKG- not indicated  Rating scales- none needed     4) Referrals:    Open to revisiting DBT for later.  Sleep medicine referral  previously placed     5) Other: none     6) Follow-up: Return to clinic in 5 weeks.    Future Considerations:   -Welbutrin for attention/concentration deficits, nicotine.   -Can consider gabapentin to help  with cannabis cessation.                 Interval History   -Things have been better at work. With manager.   -Things have been going well.     Mental Health:   -Overall not too bad with current events.   -Would like to hold off on pursuing ADHD and ASD diagnoses for the time being.   -Has support during this time with DND group.   -Anxiety: Has been about the same.    -In normal life things have been pretty well. Current events contributing but managing.   Depression: Feeling down for a while because putting off tasks that he was supposed to do but now they are done.   -Has been dieting.    -Discussed risks with calorie deficiting. Aware of these , incorporating balance with this. Getting adequate nutrition.   -Sleep: Pretty good. Not snoring as much.   -SI/HI: No  -Lightheadedness not as much anymore.   -Discussed the possibility of start wellbutrin- given attention concerns and also interest in smoking cessation (see substance use section below).   -Assessed for Bipolar hsitory:    -Denies manic history.    -No history of no sleeping for prolonged periods.    -No hx of excessive spending       Current Social History:  Social:           - for [employer] in [place]. Lives in [place].           -Two cats: Merlin, Jojo (orange boy).            -Wife-12 years together. Recently .            -Support: Lives with wife and 2 other roommates. Parents are a good, strong support. Can rely on them. Has some good friends that he can contact. Cats.            -Things that bring him lora: Play video games, watch anime, watch tv shows, movies, bar scene, going to a restaurant with a group of people, loves eating. Biggest hobby: D&D. Multiple sessions throughout the week-stephanie jones Monday is his campaign . Alternates between running the session and playing the session. Every other Sunday, has another session. D&D is very therapeutic-helps a lot wth social anxiety. A lot of Neurodivergent people  "fluctuate to it. Usually go from 6:30 to 10. D&D also helps build a structure.              Pertinent Substance Use:   Updated this visit. Updates in bold.     Cannabis:   -Everyone in the house has cut back. The irritability amongst all of them has extremely challenging. Cut down to a couple times a day -just whenever they feel the necessity for it. Asked about gabapentin (this was a discussion in the past).  However, given intention to return to cannabis use I did not recommend prescribing this at this time.    - Last visit: Back to every day use. Doesn't want to go through process of stopping with irritability with everything going on.   Nicotine:    -Interested in cutting down on nicotine-vaping.    -Using a 21mg patch. Has been helpful. Discussed possibility of starting Wellbutrin to help with nicotine cessation and also difficulties with attention/concentration. Though we have discussed cannabis use can also exacerbate these symptoms as well.   Alcohol:    - -Has significantly reduced alcohol intake. Told self he's not allowed to drink at home anymore.      -Wants it to be something that is mostly for special occasions, vacations, etc.    -No cravings for alcohol.    -Previously: \"Here and there\". Motivation to not drink: Health, doesn't want to be dependent on alcohol. Wants to lose weight. Lately 1-2x/week. Last week drank twice.  Limits self to beer and can stop by self by getting too full. Drinks IPA-usually drink a 4 pack. If still awake then may drink other things but usually is tired and ready for bed.   -Has been through treatment twice before in life.  -No other substances.     -Has previously reported that alcohol and cannabis have not interfered with work.      Psych and Medical ROS per HPI                Summary Points of Current Care  *Carried forward from last psychiatry progress note-will continue to update accordingly*  The following section contains information copied from previous note by " "Theodore on 5/8/22 and has been reviewed, edited, and verified by the patient.     Mark first experienced anxiety for most of his childhood, until age 19 did not know his father because parents . Grew up with with IEP for LD with aggression/fights at school, depressive episodes in adolescence with SI and SIB. No hospitalizations, has been inconsistent with medications and  care until recently.   Describes difficulty with social cues that result in frustration and anxiety that he identifies with mild autism - no psychometric testing.     Pertinent Items Include: suicide attempt , suicidal ideation, SIB , aggression and substance use: cannabis                  Physical Exam  (Vitals Only)    Ht 1.803 m (5' 11\")   Wt 96.6 kg (213 lb)   BMI 29.71 kg/m    Pulse Readings from Last 3 Encounters:   01/07/25 73   05/08/24 92   02/28/22 114     Wt Readings from Last 3 Encounters:   04/29/25 96.6 kg (213 lb)   02/18/25 96.2 kg (212 lb)   01/07/25 98.3 kg (216 lb 11.2 oz)     BP Readings from Last 3 Encounters:   01/07/25 112/74   05/08/24 124/86   02/28/22 136/80                      Mental Status Exam    Alertness: alert  and oriented  Appearance: well groomed  Behavior/Demeanor: cooperative, pleasant, and calm, with good  eye contact   Speech: normal and regular rate and rhythm  Language: intact and no problems  Psychomotor: no psychomotor agitation, retardation or abnormal involuntary movements appreciated  Mood: ok  Affect: full range and appropriate;Reactive, bright at times, congruent to: mood- yes, content- yes  Thought Process/Associations: linear, logical, goal directed  Thought Content:  Reports none;  Denies suicidal & violent ideation and delusions  Perception:  Reports none;  Denies hallucinations  Insight: fair  Judgment: fair  Cognition: does  appear grossly intact; formal cognitive testing was not done  Gait and Station: N/A (telehealth)                Past Psychotropic Medication Trials    *Carried " forward from last psychiatry progress note-will continue to update accordingly*   Medication Max Dose (mg) Dates / Duration Helpful? DC Reason / Adverse Effects?   Celexa  20      Inadequate trial     Seroquel  200      Helps w sleep    Buspar        Sexual side effects    Lexapro  20  2023 - 6 months No   Not effective   Intuniv   current                     Past Medical History     Patient Active Problem List   Diagnosis    Mood disorder    Marijuana use, episodic    Hyperlipidemia LDL goal <100                     Medications     Current Outpatient Medications   Medication Sig Dispense Refill    desonide (DESOWEN) 0.05 % external cream APPLY TWICE DAILY TO LSC, 3 WEEKS ON, 1 WEEK OF. REPEAT AS NEEDED 15 g 3    guanFACINE HCl (INTUNIV) 3 MG TB24 24 hr tablet Take 1 tablet (3 mg) by mouth at bedtime. 30 tablet 2    sertraline (ZOLOFT) 50 MG tablet Take 3 tablets (150 mg) by mouth daily. 90 tablet 2                     Data         8/13/2024    10:00 AM 12/30/2024     2:05 PM 4/1/2025    10:21 AM   PROMIS-10 Total Score w/o Sub Scores   PROMIS TOTAL - SUBSCORES 25 24  25        Proxy-reported         1/7/2025     1:45 PM 2/18/2025     8:41 AM 4/1/2025    10:18 AM   PHQ-9 SCORE   PHQ-9 Total Score MyChart 10 (Moderate depression) 9 (Mild depression) 10 (Moderate depression)   PHQ-9 Total Score 10  9  10        Patient-reported    Proxy-reported         8/13/2024     9:55 AM 12/30/2024     2:02 PM 4/1/2025    10:19 AM   JUDITH-7 SCORE   Total Score 14 (moderate anxiety) 7 (mild anxiety) 11 (moderate anxiety)   Total Score 14 7  11        Proxy-reported       Liver/Kidney Function, TSH Metabolic Blood counts   Recent Labs   Lab Test 01/21/25  1335   AST 22   ALT 23   ALKPHOS 78   CR 0.93     No lab results found. Recent Labs   Lab Test 01/21/25  1335   CHOL 245*   TRIG 188*   *   HDL 31*     Recent Labs   Lab Test 04/03/23  1139   A1C 5.2     Recent Labs   Lab Test 01/21/25  1335   GLC 97    Recent Labs   Lab Test  01/21/25  1335   WBC 6.4   HGB 14.6   HCT 42.5   MCV 87           PROVIDER:   Gely Dinh MD  PGY-3 Psychiatry  [facility]     Level of Medical Decision Making:   - At least 1 chronic problem that is not stable  - Engaged in prescription drug management during visit (discussed any medication benefits, side effects, alternatives, etc.)     The longitudinal plan of care for the diagnosis(es)/condition(s) as documented were addressed during this visit. Due to the added complexity in care, I will continue to support Mark in the subsequent management and with ongoing continuity of care.     Patient staffed in clinic with Dr. Barragan who will sign the note.  Supervisor is Dr. Barragan.

## 2025-04-29 NOTE — NURSING NOTE
Current patient location: [address]  [facility] [address]    Is the patient currently in the state of [place]? YES    Visit mode: VIDEO    If the visit is dropped, the patient can be reconnected by:VIDEO VISIT: Text to cell phone:   Telephone Information:   Mobile [phone]       Will anyone else be joining the visit? NO  (If patient encounters technical issues they should call [phone]    Are changes needed to the allergy or medication list? No    Are refills needed on medications prescribed by this physician? NO    Rooming Documentation:  Questionnaire(s) completed    Reason for visit: RECHECK    Selina PINEDA

## 2025-04-29 NOTE — PATIENT INSTRUCTIONS
Medication Changes:   I will order the Nicotine 21mg patch. I recommend taking the patch off at night as it may cause vivid dreams.     Here is some more information on Wellbutrin if you are interested in starting this:     https://www.[facility].org/about-mental-illness/treatments/mental-health-medications/types-of-medication/bupropion-wellbutrin/.     Follow Up: 5 weeks    **For crisis resources, please see the information at the end of this document**   Patient Education    Thank you for coming to the [facility].     Lab Testing:  If you had lab testing today and your results are reassuring or normal they will be mailed to you or sent through [employer] within 7 days. If the lab tests need quick action we will call you with the results. The phone number we will call with results is # [phone]. If this is not the best number please call our clinic and change the number.     Medication Refills:  If you need any refills please call your pharmacy and they will contact us. Our fax number for refills is [phone].   Three business days of notice are needed for general medication refill requests.   Five business days of notice are needed for controlled substance refill requests.   If you need to change to a different pharmacy, please contact the new pharmacy directly. The new pharmacy will help you get your medications transferred.     Contact Us:  Please call [phone] during business hours (8-5:00 M-F).   If you have medication related questions after clinic hours, or on the weekend, please call [phone].     Financial Assistance [phone]   Medical Records [phone]       MENTAL HEALTH CRISIS RESOURCES:  For a emergency help, please call 911 or go to the nearest Emergency Department.     Emergency Walk-In Options:   [facility] Unit @ [facility]): [phone] - Specialized mental health emergency area designed to be calming  [facility]  [facility] ([place]): [phone]  [facility] ([place]): [phone]  [facility] ([place]): [phone]    [facility] Crisis Information:   [place]: [phone]  [place]: [phone]  [place] ([place]) - Adult: [phone]     Child: [phone]  [place] - Adult: [phone]     Child: [phone]  [place]: [phone]  List of all [facility] resources:   https://[place].[facility]/dhs/people-we-serve/adults/health-care/mental-health/resources/crisis-contacts.jsp    National Crisis Information:   Crisis Text Line: Text  [place]  to 739441  Suicide & Crisis Lifeline: 988  National Suicide Prevention Lifeline: 4-175-103-TALK (1-769.426.2532)       For online chat options, visit https://suicidepreventionlifeline.org/chat/  Poison Control Center: 5-779-808-5623  Trans Lifeline: 3-481-924-0152 - Hotline for transgender people of all ages  [employer]: 1-658.995.5444 - Hotline for LGBT youth     For Non-Emergency Support:   Fast Tracker: Mental Health & Substance Use Disorder Resources -   https://www.[employer].org/

## 2025-05-29 ENCOUNTER — MYC REFILL (OUTPATIENT)
Dept: PSYCHIATRY | Facility: CLINIC | Age: 35
End: 2025-05-29
Payer: COMMERCIAL

## 2025-05-29 DIAGNOSIS — F40.10 SOCIAL ANXIETY DISORDER: ICD-10-CM

## 2025-06-17 ENCOUNTER — VIRTUAL VISIT (OUTPATIENT)
Dept: PSYCHIATRY | Facility: CLINIC | Age: 35
End: 2025-06-17
Attending: STUDENT IN AN ORGANIZED HEALTH CARE EDUCATION/TRAINING PROGRAM
Payer: COMMERCIAL

## 2025-06-17 DIAGNOSIS — R45.89 EMOTIONAL DYSREGULATION: ICD-10-CM

## 2025-06-17 DIAGNOSIS — R06.83 SNORING: ICD-10-CM

## 2025-06-17 DIAGNOSIS — F40.10 SOCIAL ANXIETY DISORDER: ICD-10-CM

## 2025-06-17 DIAGNOSIS — F12.90 CANNABIS USE DISORDER: Primary | ICD-10-CM

## 2025-06-17 DIAGNOSIS — R41.840 ATTENTION OR CONCENTRATION DEFICIT: ICD-10-CM

## 2025-06-17 DIAGNOSIS — F41.9 ANXIETY DISORDER, UNSPECIFIED TYPE: ICD-10-CM

## 2025-06-17 DIAGNOSIS — F17.200 NICOTINE USE DISORDER: ICD-10-CM

## 2025-06-17 PROCEDURE — G2211 COMPLEX E/M VISIT ADD ON: HCPCS | Mod: 95

## 2025-06-17 PROCEDURE — 98006 SYNCH AUDIO-VIDEO EST MOD 30: CPT | Mod: HN

## 2025-06-17 PROCEDURE — 1126F AMNT PAIN NOTED NONE PRSNT: CPT | Mod: 95

## 2025-06-17 RX ORDER — VARENICLINE TARTRATE 0.5 (11)-1
KIT ORAL
Qty: 53 TABLET | Refills: 0 | Status: CANCELLED | OUTPATIENT
Start: 2025-06-17

## 2025-06-17 RX ORDER — NICOTINE 21 MG/24HR
1 PATCH, TRANSDERMAL 24 HOURS TRANSDERMAL EVERY 24 HOURS
Qty: 30 PATCH | Refills: 2 | Status: SHIPPED | OUTPATIENT
Start: 2025-06-17

## 2025-06-17 RX ORDER — GUANFACINE 3 MG/1
3 TABLET, EXTENDED RELEASE ORAL AT BEDTIME
Qty: 30 TABLET | Refills: 2 | Status: SHIPPED | OUTPATIENT
Start: 2025-06-17

## 2025-06-17 ASSESSMENT — PAIN SCALES - GENERAL: PAINLEVEL_OUTOF10: NO PAIN (0)

## 2025-06-17 ASSESSMENT — PATIENT HEALTH QUESTIONNAIRE - PHQ9
10. IF YOU CHECKED OFF ANY PROBLEMS, HOW DIFFICULT HAVE THESE PROBLEMS MADE IT FOR YOU TO DO YOUR WORK, TAKE CARE OF THINGS AT HOME, OR GET ALONG WITH OTHER PEOPLE: SOMEWHAT DIFFICULT
SUM OF ALL RESPONSES TO PHQ QUESTIONS 1-9: 9
SUM OF ALL RESPONSES TO PHQ QUESTIONS 1-9: 9

## 2025-06-17 NOTE — PROGRESS NOTES
"Virtual Visit Details    Type of service:  Video Visit     Originating Location (pt. Location): {video visit patient location:849191::\"Home\"}  {PROVIDER LOCATION On-site should be selected for visits conducted from your clinic location or adjoining [facility] hospital, academic office, or other nearby [facility] building. Off-site should be selected for all other provider locations, including home:545511}  Distant Location (provider location):  {virtual location provider:280943}  Platform used for Video Visit: {Virtual Visit Platforms:325866::\"SocialMatica\"}    Virtual Visit Details    Type of service:  Video Visit     Originating Location (pt. Location): Home  {PROVIDER LOCATION On-site should be selected for visits conducted from your clinic location or adjoining [facility] hospital, academic office, or other nearby [facility] building. Off-site should be selected for all other provider locations, including home:683541}  Distant Location (provider location):  On-site  Platform used for Video Visit: Well         [facility]  General Clinic Team  MEDICAL PROGRESS NOTE       CARE TEAM:    PCP- Lior Lan  Therapist- None     Mark is a 34 year old who uses the pronouns he, him, his.               Diagnosis  Social anxiety disorder  Emotional dysregulation  Attention or concentration deficit  -R/o ADHD  Cannabis use disorder, unspecified severity, active  R/O unspecified mood disorder (diagnostic history of bipolar disorder)    Historical Diagnosis:   Learning disability (diagnosed in youth, with IEP during school)    Assessment & Plan   Mark is a 34 year old with the above diagnoses. Please see transfer of care note on 8/13 /24 for more details.We recently have been working on addressing anxiety, and mood dysregulation. Recently we increased his Sertraline due to elevated anxiety, which seemed to be strongly correlated to his work stress.      Today, Mark " endorsed doing ok overall. Has been tolerating the increased Sertraline. Work stress has improved though endorsed ongoing but manageable anxiety with current events. He has cut down on cannabis though said that he will likely return to use at some point. We have discussed the risks with cannabis, particularly the associated attention and concentration deficits given that has also been a concern for Mark. Mark expressed interested in wanting to permanently stop vaping (nicotine). He has tried a patch and said that was helpful and was ok with me prescribing it. We also discussed the possibility of starting Wellbutrin later to help with nicotine cessation and this may also be an augmentor for mood and potentially address underlying attention, concentration deficits though we discussed the risk for worsening anxiety and possibly kayla if there is an underlying bipolar disorder (he was previously diagnosed with this by another provider). Though upon further conversation today, I am less concerned for a bipolar history,. Mark's symptoms of impulsivity, historically seem to be more consistent with mood dysregulation. However, further investigation/close monitoring may be warranted if starting Wellbutrin given this previous diagnosis. Mark has also cut down on his alcohol use notably. Other than starting the patch, we will not make any changes today. Of note, Mark  said that his snoring has improved though I do think this is something that should be followed up on to rule out FAM-I will ask about this more at upcoming visits. Sleep medicine referral previously placed.     Mark does not appear to be at imminent risk of harm to himself or anyone else this visit.      Psychotropic Drug Interactions:  [PSYCHCLINICDDI]  none  Management: N/A     [place]MP was not checked today: not using controlled substances     Risk Statements:   Treatment Risk- Risks, benefits, alternatives and potential adverse effects have  been discussed and are understood.   Safety Risk-Mark did not appear to be an imminent safety risk to self or others.    PLAN    1) Medications:   -Start Nicotine patch 21mg. Advised not to use other patch at home if using this one.   - Continue sertraline 150mg at bedtime.   - Continue Intuniv 3mg at bedtime.     2) Psychotherapy:   Therapy referral placed summer 2024 (CBT). Continue to recommend follow up    3) Next due:  Labs- routine lab monitoring not indicated.   EKG- not indicated  Rating scales- none needed     4) Referrals:    Open to revisiting DBT for later.  Sleep medicine referral  previously placed     5) Other: none     6) Follow-up: Return to clinic in 5 weeks.    Future Considerations:   -Welbutrin for attention/concentration deficits, nicotine.   -Can consider gabapentin to help with cannabis cessation.                 Interval History     Today:  -Not too bad. Same old, same old. Slowly progressively gettingg back on chores again.   -State of the world is getting to him.    -Processing; Laughing. Does talk to partner about it.   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    -Validating that he's not the problem.   -Roommates quit their jobs at the same time 2 months ago. Covering roommates financially. This is getting better.   -DND: Started running another campaign.     Substance Use:  -Alcohol: Up and down. Go periods without it. Then will start drinking again and realizes he might need to take a step back. Usually he will notice after a couple days. And that he needs to take a step back. Recognizes the AUD in his family. Feels like he could reach out if it's becoming more difficult to manage. His friends would step in to.   -Nicotine: Patch: Has been trying to. Needs medical tape. Very sticky, hurting skin.    -Smoking every couple of minutes. Without the patch , will look for a puff. The patch has helped a lot.    -The only time he's struggled when he's drinking.    -Might keep  trying the patches for a little bit. And then I agreed he could reach out to start chantix.    -Struggles with oral fixation. Patch has helped with eliminating cravings with nicotine.    -Talked about lowering concentration.    -Might try smoking flower.  The focus on cutting down on this. Discussed not recommending this. Wants to use cannabis to cut off nicotine. Easier for him to kick cannabis then to kick nicotine.   -Wants to focus on one change at  time.     -Cannabis:Use :      -Where it has been.    -3-4 times a dabs a day.    -Previously discussed risks. He wants to cut down on nicotne      -Mental Health:   -Forgot to take medication for 2 days. Knows it's working.   -Overheating a lot . Sweating a lot too.   -Lightheadedness: not often.   -Feels like he's been sweating a lot . Things its sertraline higher dose.       Plan:  -Ask pharmacy team to  see if there are ways to address patch falling other than medical or less strong med tape.   -Decrease Sertraline to 125mg.   -Refills for Nicotine Patch.   -Ok with not scheduling an appointment to start chantix. Discussed risks/benefits.     Last Visit:   -Things have been better at work. With manager.   -Things have been going well.     Mental Health:   -Overall not too bad with current events.   -Would like to hold off on pursuing ADHD and ASD diagnoses for the time being.   -Has support during this time with DND group.   -Anxiety: Has been about the same.    -In normal life things have been pretty well. Current events contributing but managing.   Depression: Feeling down for a while because putting off tasks that he was supposed to do but now they are done.   -Has been dieting.    -Discussed risks with calorie deficiting. Aware of these , incorporating balance with this. Getting adequate nutrition.   -Sleep: Pretty good. Not snoring as much.   -SI/HI: No  -Lightheadedness not as much anymore.   -Discussed the possibility of start wellbutrin- given attention  concerns and also interest in smoking cessation (see substance use section below).   -Assessed for Bipolar hsitory:    -Denies manic history.    -No history of no sleeping for prolonged periods.    -No hx of excessive spending       Current Social History:  Social:           - for [employer] in [place]. Lives in [place].           -Two cats: Merlin, Jojo (orange boy).            -Wife-12 years together. Recently .            -Support: Lives with wife and 2 other roommates. Parents are a good, strong support. Can rely on them. Has some good friends that he can contact. Cats.            -Things that bring him lora: Play video games, watch anime, watch tv shows, movies, bar scene, going to a restaurant with a group of people, loves eating. Biggest hobby: D&D. Multiple sessions throughout the week-eveery other Monday is his campaign . Alternates between running the session and playing the session. Every other Sunday, has another session. D&D is very therapeutic-helps a lot wth social anxiety. A lot of Neurodivergent people fluctuate to it. Usually go from 6:30 to 10. D&D also helps build a structure.              Pertinent Substance Use:   Updated this visit. Updates in bold.     Cannabis:   -Everyone in the house has cut back. The irritability amongst all of them has extremely challenging. Cut down to a couple times a day -just whenever they feel the necessity for it. Asked about gabapentin (this was a discussion in the past).  However, given intention to return to cannabis use I did not recommend prescribing this at this time.    - Last visit: Back to every day use. Doesn't want to go through process of stopping with irritability with everything going on.   Nicotine:    -Interested in cutting down on nicotine-vaping.    -Using a 21mg patch. Has been helpful. Discussed possibility of starting Wellbutrin to help with nicotine cessation and also difficulties with attention/concentration. Though  "we have discussed cannabis use can also exacerbate these symptoms as well.   Alcohol:    - -Has significantly reduced alcohol intake. Told self he's not allowed to drink at home anymore.      -Wants it to be something that is mostly for special occasions, vacations, etc.    -No cravings for alcohol.    -Previously: \"Here and there\". Motivation to not drink: Health, doesn't want to be dependent on alcohol. Wants to lose weight. Lately 1-2x/week. Last week drank twice.  Limits self to beer and can stop by self by getting too full. Drinks IPA-usually drink a 4 pack. If still awake then may drink other things but usually is tired and ready for bed.   -Has been through treatment twice before in life.  -No other substances.     -Has previously reported that alcohol and cannabis have not interfered with work.      Psych and Medical ROS per HPI                Summary Points of Current Care  *Carried forward from last psychiatry progress note-will continue to update accordingly*  The following section contains information copied from previous note by Dr Jaimes on 5/8/22 and has been reviewed, edited, and verified by the patient.     Mark first experienced anxiety for most of his childhood, until age 19 did not know his father because parents . Grew up with with IEP for LD with aggression/fights at school, depressive episodes in adolescence with SI and SIB. No hospitalizations, has been inconsistent with medications and MH care until recently.   Describes difficulty with social cues that result in frustration and anxiety that he identifies with mild autism - no psychometric testing.     Pertinent Items Include: suicide attempt , suicidal ideation, SIB , aggression and substance use: cannabis                  Physical Exam  (Vitals Only)    There were no vitals taken for this visit.  Pulse Readings from Last 3 Encounters:   01/07/25 73   05/08/24 92   02/28/22 114     Wt Readings from Last 3 Encounters:   04/29/25 " 96.6 kg (213 lb)   02/18/25 96.2 kg (212 lb)   01/07/25 98.3 kg (216 lb 11.2 oz)     BP Readings from Last 3 Encounters:   01/07/25 112/74   05/08/24 124/86   02/28/22 136/80                      Mental Status Exam    Alertness: alert  and oriented  Appearance: well groomed  Behavior/Demeanor: cooperative, pleasant, and calm, with good  eye contact   Speech: normal and regular rate and rhythm  Language: intact and no problems  Psychomotor: no psychomotor agitation, retardation or abnormal involuntary movements appreciated  Mood: ok  Affect: full range and appropriate;Reactive, bright at times, congruent to: mood- yes, content- yes  Thought Process/Associations: linear, logical, goal directed  Thought Content:  Reports none;  Denies suicidal & violent ideation and delusions  Perception:  Reports none;  Denies hallucinations  Insight: fair  Judgment: fair  Cognition: does  appear grossly intact; formal cognitive testing was not done  Gait and Station: N/A (telehealth)                Past Psychotropic Medication Trials    *Carried forward from last psychiatry progress note-will continue to update accordingly*   Medication Max Dose (mg) Dates / Duration Helpful? DC Reason / Adverse Effects?   Celexa  20      Inadequate trial     Seroquel  200      Helps w sleep    Buspar        Sexual side effects    Lexapro  20  2023 - 6 months No   Not effective   Intuniv   current                     Past Medical History     Patient Active Problem List   Diagnosis    Mood disorder    Marijuana use, episodic    Hyperlipidemia LDL goal <100                     Medications     Current Outpatient Medications   Medication Sig Dispense Refill    desonide (DESOWEN) 0.05 % external cream APPLY TWICE DAILY TO LSC, 3 WEEKS ON, 1 WEEK OF. REPEAT AS NEEDED 15 g 3    guanFACINE HCl (INTUNIV) 3 MG TB24 24 hr tablet Take 1 tablet (3 mg) by mouth at bedtime. 30 tablet 2    nicotine (NICODERM CQ) 21 MG/24HR 24 hr patch Place 1 patch over 24 hours  onto the skin every 24 hours. 30 patch 2    sertraline (ZOLOFT) 50 MG tablet Take 3 tablets (150 mg) by mouth daily. 90 tablet 2                     Data         8/13/2024    10:00 AM 12/30/2024     2:05 PM 4/1/2025    10:21 AM   PROMIS-10 Total Score w/o Sub Scores   PROMIS TOTAL - SUBSCORES 25 24  25        Proxy-reported         2/18/2025     8:41 AM 4/1/2025    10:18 AM 6/17/2025     8:37 AM   PHQ-9 SCORE   PHQ-9 Total Score MyChart 9 (Mild depression) 10 (Moderate depression) 9 (Mild depression)   PHQ-9 Total Score 9  10  9        Proxy-reported         8/13/2024     9:55 AM 12/30/2024     2:02 PM 4/1/2025    10:19 AM   JUDITH-7 SCORE   Total Score 14 (moderate anxiety) 7 (mild anxiety) 11 (moderate anxiety)   Total Score 14 7  11        Proxy-reported       Liver/Kidney Function, TSH Metabolic Blood counts   Recent Labs   Lab Test 01/21/25  1335   AST 22   ALT 23   ALKPHOS 78   CR 0.93     No lab results found. Recent Labs   Lab Test 01/21/25  1335   CHOL 245*   TRIG 188*   *   HDL 31*     Recent Labs   Lab Test 04/03/23  1139   A1C 5.2     Recent Labs   Lab Test 01/21/25  1335   GLC 97    Recent Labs   Lab Test 01/21/25  1335   WBC 6.4   HGB 14.6   HCT 42.5   MCV 87           PROVIDER:   Gely Dinh MD  PGY-3 Psychiatry  [facility]     Level of Medical Decision Making:   - At least 1 chronic problem that is not stable  - Engaged in prescription drug management during visit (discussed any medication benefits, side effects, alternatives, etc.)     The longitudinal plan of care for the diagnosis(es)/condition(s) as documented were addressed during this visit. Due to the added complexity in care, I will continue to support Mark in the subsequent management and with ongoing continuity of care.     Patient staffed in clinic with Dr. Barragan who will sign the note.  Supervisor is Dr. Barragan.

## 2025-06-17 NOTE — PATIENT INSTRUCTIONS
Medication changes: Decrease sertraline to 125mg.     Other:  -I reached out to see if there are other strategies to address the nicotine patch issue.   -If you would like to start Varenicline, please reach out.     Follow Up: 2 months with new resident provider.     **For crisis resources, please see the information at the end of this document**   Patient Education    Thank you for coming to the [facility] [place] [facility].     Lab Testing:  If you had lab testing today and your results are reassuring or normal they will be mailed to you or sent through [facility] within 7 days. If the lab tests need quick action we will call you with the results. The phone number we will call with results is # [phone]. If this is not the best number please call our clinic and change the number.     Medication Refills:  If you need any refills please call your pharmacy and they will contact us. Our fax number for refills is [phone].   Three business days of notice are needed for general medication refill requests.   Five business days of notice are needed for controlled substance refill requests.   If you need to change to a different pharmacy, please contact the new pharmacy directly. The new pharmacy will help you get your medications transferred.     Contact Us:  Please call [phone] during business hours (8-5:00 M-F).   If you have medication related questions after clinic hours, or on the weekend, please call [phone].     Financial Assistance [phone]   Medical Records [phone]       MENTAL HEALTH CRISIS RESOURCES:  For a emergency help, please call 911 or go to the nearest Emergency Department.     Emergency Walk-In Options:   [facility] Unit @ [facility] ([place]): [phone] - Specialized mental health emergency area designed to be calming  [facility] ([place]): [phone]  [facility] ([place]):  [phone]  [facility] ([place]): [phone]    [facility] Crisis Information:   [place]: [phone]  [place]: [phone]  [place] BURRIS) - Adult: [phone]     Child: [phone]  [place] - Adult: [phone]     Child: [phone]  [place]: [phone]  List of all [facility] resources:   [email or web address]-[email or web address]-care/mental-health/resources/crisis-contacts.jsp    National Crisis Information:   Crisis Text Line: Text  [place]  to 826915  Suicide & Crisis Lifeline: 988  National Suicide Prevention Lifeline: 8-238-157-TALK (1-979.595.5521)       For online chat options, visit https://suicidepreventionlifeline.org/chat/  Poison Control Center: 1-208.885.9758  Trans Lifeline: 4-981-673-3282 - Hotline for transgender people of all ages  [employer]: 6-223-756-5389 - Hotline for LGBT youth     For Non-Emergency Support:   Fast Tracker: Mental Health & Substance Use Disorder Resources -   https://www.[employer].org/

## 2025-06-17 NOTE — NURSING NOTE
Current patient location: [address]  [facility] [address]    Is the patient currently in the state of [place]? YES    Visit mode: VIDEO    If the visit is dropped, the patient can be reconnected by:VIDEO VISIT: Text to cell phone:   Telephone Information:   Mobile [phone]       Will anyone else be joining the visit? NO  (If patient encounters technical issues they should call [phone])    Are changes needed to the allergy or medication list? No    Are refills needed on medications prescribed by this physician? NO    Rooming Documentation:  Questionnaire(s) completed    Reason for visit: RECHECK    Jennifer PINEDA

## 2025-08-06 ENCOUNTER — THERAPY VISIT (OUTPATIENT)
Dept: PHYSICAL THERAPY | Facility: CLINIC | Age: 35
End: 2025-08-06
Payer: COMMERCIAL

## 2025-08-06 ENCOUNTER — MYC MEDICAL ADVICE (OUTPATIENT)
Dept: INTERNAL MEDICINE | Facility: CLINIC | Age: 35
End: 2025-08-06

## 2025-08-06 DIAGNOSIS — M54.50 ACUTE BILATERAL LOW BACK PAIN WITHOUT SCIATICA: ICD-10-CM

## 2025-08-06 PROCEDURE — 97161 PT EVAL LOW COMPLEX 20 MIN: CPT | Mod: GP | Performed by: PHYSICAL THERAPIST

## 2025-08-06 PROCEDURE — 97110 THERAPEUTIC EXERCISES: CPT | Mod: GP | Performed by: PHYSICAL THERAPIST

## 2025-08-06 PROCEDURE — 97530 THERAPEUTIC ACTIVITIES: CPT | Mod: GP | Performed by: PHYSICAL THERAPIST
